# Patient Record
Sex: MALE | Race: WHITE | Employment: OTHER | URBAN - NONMETROPOLITAN AREA
[De-identification: names, ages, dates, MRNs, and addresses within clinical notes are randomized per-mention and may not be internally consistent; named-entity substitution may affect disease eponyms.]

---

## 2021-12-22 ENCOUNTER — APPOINTMENT (OUTPATIENT)
Dept: CT IMAGING | Age: 61
End: 2021-12-22
Payer: MEDICARE

## 2021-12-22 ENCOUNTER — APPOINTMENT (OUTPATIENT)
Dept: ULTRASOUND IMAGING | Age: 61
End: 2021-12-22
Payer: MEDICARE

## 2021-12-22 ENCOUNTER — HOSPITAL ENCOUNTER (EMERGENCY)
Age: 61
Discharge: HOME | End: 2021-12-22
Attending: EMERGENCY MEDICINE
Payer: MEDICARE

## 2021-12-22 ENCOUNTER — APPOINTMENT (OUTPATIENT)
Dept: GENERAL RADIOLOGY | Age: 61
End: 2021-12-22
Payer: MEDICARE

## 2021-12-22 VITALS
OXYGEN SATURATION: 99 % | BODY MASS INDEX: 23.62 KG/M2 | SYSTOLIC BLOOD PRESSURE: 115 MMHG | HEART RATE: 81 BPM | TEMPERATURE: 98.5 F | WEIGHT: 190 LBS | RESPIRATION RATE: 16 BRPM | DIASTOLIC BLOOD PRESSURE: 84 MMHG | HEIGHT: 75 IN

## 2021-12-22 DIAGNOSIS — R10.31 RIGHT LOWER QUADRANT ABDOMINAL PAIN: Primary | ICD-10-CM

## 2021-12-22 DIAGNOSIS — N50.819 PAIN IN TESTICLE, UNSPECIFIED LATERALITY: ICD-10-CM

## 2021-12-22 LAB
A/G RATIO: 1.8 (ref 1.1–2.2)
ALBUMIN SERPL-MCNC: 4.7 G/DL (ref 3.4–5)
ALP BLD-CCNC: 94 U/L (ref 40–129)
ALT SERPL-CCNC: 22 U/L (ref 10–40)
ANION GAP SERPL CALCULATED.3IONS-SCNC: 13 MMOL/L (ref 3–16)
AST SERPL-CCNC: 30 U/L (ref 15–37)
BACTERIA: ABNORMAL /HPF
BASOPHILS ABSOLUTE: 0.1 K/UL (ref 0–0.2)
BASOPHILS RELATIVE PERCENT: 1 %
BILIRUB SERPL-MCNC: 0.5 MG/DL (ref 0–1)
BILIRUBIN URINE: NEGATIVE
BLOOD, URINE: ABNORMAL
BUN BLDV-MCNC: 15 MG/DL (ref 7–20)
CALCIUM SERPL-MCNC: 9.8 MG/DL (ref 8.3–10.6)
CHLORIDE BLD-SCNC: 105 MMOL/L (ref 99–110)
CLARITY: CLEAR
CO2: 23 MMOL/L (ref 21–32)
COLOR: YELLOW
CREAT SERPL-MCNC: 0.7 MG/DL (ref 0.9–1.3)
EOSINOPHILS ABSOLUTE: 0.1 K/UL (ref 0–0.6)
EOSINOPHILS RELATIVE PERCENT: 0.6 %
EPITHELIAL CELLS, UA: ABNORMAL /HPF (ref 0–5)
GFR AFRICAN AMERICAN: >60
GFR NON-AFRICAN AMERICAN: >60
GLUCOSE BLD-MCNC: 127 MG/DL (ref 70–99)
GLUCOSE URINE: NEGATIVE MG/DL
HCT VFR BLD CALC: 43.6 % (ref 40.5–52.5)
HEMOGLOBIN: 15.1 G/DL (ref 13.5–17.5)
KETONES, URINE: 15 MG/DL
LACTIC ACID: 1.7 MMOL/L (ref 0.4–2)
LEUKOCYTE ESTERASE, URINE: NEGATIVE
LIPASE: 47 U/L (ref 13–60)
LYMPHOCYTES ABSOLUTE: 2.8 K/UL (ref 1–5.1)
LYMPHOCYTES RELATIVE PERCENT: 18.9 %
MCH RBC QN AUTO: 33.9 PG (ref 26–34)
MCHC RBC AUTO-ENTMCNC: 34.6 G/DL (ref 31–36)
MCV RBC AUTO: 97.9 FL (ref 80–100)
MICROSCOPIC EXAMINATION: YES
MONOCYTES ABSOLUTE: 0.6 K/UL (ref 0–1.3)
MONOCYTES RELATIVE PERCENT: 4 %
MUCUS: ABNORMAL /LPF
NEUTROPHILS ABSOLUTE: 11.1 K/UL (ref 1.7–7.7)
NEUTROPHILS RELATIVE PERCENT: 75.5 %
NITRITE, URINE: NEGATIVE
OCCULT BLOOD DIAGNOSTIC: NORMAL
PDW BLD-RTO: 13.7 % (ref 12.4–15.4)
PH UA: 7 (ref 5–8)
PLATELET # BLD: 257 K/UL (ref 135–450)
PMV BLD AUTO: 5.8 FL (ref 5–10.5)
POTASSIUM REFLEX MAGNESIUM: 4.6 MMOL/L (ref 3.5–5.1)
PROTEIN UA: NEGATIVE MG/DL
RBC # BLD: 4.45 M/UL (ref 4.2–5.9)
RBC UA: ABNORMAL /HPF (ref 0–4)
SODIUM BLD-SCNC: 141 MMOL/L (ref 136–145)
SPECIFIC GRAVITY UA: 1.02 (ref 1–1.03)
TOTAL PROTEIN: 7.3 G/DL (ref 6.4–8.2)
URINE REFLEX TO CULTURE: ABNORMAL
URINE TYPE: ABNORMAL
UROBILINOGEN, URINE: 0.2 E.U./DL
WBC # BLD: 14.7 K/UL (ref 4–11)
WBC UA: ABNORMAL /HPF (ref 0–5)

## 2021-12-22 PROCEDURE — 76870 US EXAM SCROTUM: CPT

## 2021-12-22 PROCEDURE — 85025 COMPLETE CBC W/AUTO DIFF WBC: CPT

## 2021-12-22 PROCEDURE — 71045 X-RAY EXAM CHEST 1 VIEW: CPT

## 2021-12-22 PROCEDURE — 82270 OCCULT BLOOD FECES: CPT

## 2021-12-22 PROCEDURE — 2580000003 HC RX 258: Performed by: EMERGENCY MEDICINE

## 2021-12-22 PROCEDURE — 6360000004 HC RX CONTRAST MEDICATION: Performed by: EMERGENCY MEDICINE

## 2021-12-22 PROCEDURE — 6370000000 HC RX 637 (ALT 250 FOR IP): Performed by: EMERGENCY MEDICINE

## 2021-12-22 PROCEDURE — 6360000002 HC RX W HCPCS: Performed by: EMERGENCY MEDICINE

## 2021-12-22 PROCEDURE — 96375 TX/PRO/DX INJ NEW DRUG ADDON: CPT

## 2021-12-22 PROCEDURE — 83605 ASSAY OF LACTIC ACID: CPT

## 2021-12-22 PROCEDURE — 36415 COLL VENOUS BLD VENIPUNCTURE: CPT

## 2021-12-22 PROCEDURE — 81001 URINALYSIS AUTO W/SCOPE: CPT

## 2021-12-22 PROCEDURE — 83690 ASSAY OF LIPASE: CPT

## 2021-12-22 PROCEDURE — 96374 THER/PROPH/DIAG INJ IV PUSH: CPT

## 2021-12-22 PROCEDURE — 99285 EMERGENCY DEPT VISIT HI MDM: CPT

## 2021-12-22 PROCEDURE — 74177 CT ABD & PELVIS W/CONTRAST: CPT

## 2021-12-22 PROCEDURE — 80053 COMPREHEN METABOLIC PANEL: CPT

## 2021-12-22 PROCEDURE — 93975 VASCULAR STUDY: CPT

## 2021-12-22 RX ORDER — FENTANYL CITRATE 50 UG/ML
50 INJECTION, SOLUTION INTRAMUSCULAR; INTRAVENOUS ONCE
Status: COMPLETED | OUTPATIENT
Start: 2021-12-22 | End: 2021-12-22

## 2021-12-22 RX ORDER — BUTALBITAL, ACETAMINOPHEN AND CAFFEINE 50; 325; 40 MG/1; MG/1; MG/1
TABLET ORAL
COMMUNITY
Start: 2021-11-23

## 2021-12-22 RX ORDER — OXYCODONE HYDROCHLORIDE 20 MG/1
TABLET ORAL
COMMUNITY
Start: 2021-11-26

## 2021-12-22 RX ORDER — ESOMEPRAZOLE MAGNESIUM 40 MG/1
CAPSULE, DELAYED RELEASE ORAL
COMMUNITY
Start: 2021-12-21

## 2021-12-22 RX ORDER — PROMETHAZINE HYDROCHLORIDE 25 MG/1
TABLET ORAL
COMMUNITY
Start: 2021-11-23

## 2021-12-22 RX ORDER — FLUOXETINE HYDROCHLORIDE 20 MG/1
20 CAPSULE ORAL DAILY
COMMUNITY

## 2021-12-22 RX ORDER — TRIAMCINOLONE ACETONIDE 1 MG/G
CREAM TOPICAL
COMMUNITY
Start: 2021-12-03

## 2021-12-22 RX ORDER — ONDANSETRON 2 MG/ML
4 INJECTION INTRAMUSCULAR; INTRAVENOUS ONCE
Status: COMPLETED | OUTPATIENT
Start: 2021-12-22 | End: 2021-12-22

## 2021-12-22 RX ORDER — OXYCODONE HYDROCHLORIDE 5 MG/1
15 TABLET ORAL ONCE
Status: COMPLETED | OUTPATIENT
Start: 2021-12-22 | End: 2021-12-22

## 2021-12-22 RX ORDER — 0.9 % SODIUM CHLORIDE 0.9 %
1000 INTRAVENOUS SOLUTION INTRAVENOUS ONCE
Status: COMPLETED | OUTPATIENT
Start: 2021-12-22 | End: 2021-12-22

## 2021-12-22 RX ORDER — DROPERIDOL 2.5 MG/ML
1.25 INJECTION, SOLUTION INTRAMUSCULAR; INTRAVENOUS ONCE
Status: COMPLETED | OUTPATIENT
Start: 2021-12-22 | End: 2021-12-22

## 2021-12-22 RX ORDER — ATORVASTATIN CALCIUM 20 MG/1
TABLET, FILM COATED ORAL
COMMUNITY
Start: 2021-12-21

## 2021-12-22 RX ADMIN — SODIUM CHLORIDE 1000 ML: 9 INJECTION, SOLUTION INTRAVENOUS at 13:10

## 2021-12-22 RX ADMIN — ONDANSETRON HYDROCHLORIDE 4 MG: 2 INJECTION, SOLUTION INTRAMUSCULAR; INTRAVENOUS at 13:07

## 2021-12-22 RX ADMIN — FENTANYL CITRATE 50 MCG: 0.05 INJECTION, SOLUTION INTRAMUSCULAR; INTRAVENOUS at 13:07

## 2021-12-22 RX ADMIN — IOPAMIDOL 75 ML: 755 INJECTION, SOLUTION INTRAVENOUS at 13:43

## 2021-12-22 RX ADMIN — DROPERIDOL 1.25 MG: 2.5 INJECTION, SOLUTION INTRAMUSCULAR; INTRAVENOUS at 14:53

## 2021-12-22 RX ADMIN — OXYCODONE 15 MG: 5 TABLET ORAL at 14:53

## 2021-12-22 ASSESSMENT — PAIN DESCRIPTION - PAIN TYPE
TYPE: ACUTE PAIN

## 2021-12-22 ASSESSMENT — PAIN SCALES - WONG BAKER: WONGBAKER_NUMERICALRESPONSE: 2

## 2021-12-22 ASSESSMENT — PAIN SCALES - GENERAL
PAINLEVEL_OUTOF10: 7
PAINLEVEL_OUTOF10: 10
PAINLEVEL_OUTOF10: 7
PAINLEVEL_OUTOF10: 8
PAINLEVEL_OUTOF10: 8

## 2021-12-22 ASSESSMENT — PAIN DESCRIPTION - ORIENTATION
ORIENTATION: RIGHT;LOWER
ORIENTATION: LOWER
ORIENTATION: RIGHT;LOWER
ORIENTATION: RIGHT

## 2021-12-22 ASSESSMENT — PAIN DESCRIPTION - FREQUENCY
FREQUENCY: CONTINUOUS
FREQUENCY: INTERMITTENT

## 2021-12-22 ASSESSMENT — PAIN DESCRIPTION - ONSET
ONSET: ON-GOING

## 2021-12-22 ASSESSMENT — PAIN DESCRIPTION - DESCRIPTORS
DESCRIPTORS: STABBING
DESCRIPTORS: STABBING

## 2021-12-22 ASSESSMENT — PAIN DESCRIPTION - LOCATION
LOCATION: ABDOMEN
LOCATION: BACK
LOCATION: ABDOMEN

## 2021-12-22 ASSESSMENT — PAIN DESCRIPTION - PROGRESSION
CLINICAL_PROGRESSION: NOT CHANGED
CLINICAL_PROGRESSION: GRADUALLY IMPROVING
CLINICAL_PROGRESSION: GRADUALLY WORSENING

## 2021-12-22 NOTE — ED NOTES
N2N report called to Leann PETERSON at [facility] ED, all questions answered at this time.      Aruna Sanchez RN  12/22/21 7612 age(85 years old or older)

## 2021-12-22 NOTE — ED NOTES
Patient and family educated to go straight to [facility] ED, without stopping to eat or drink. Patient and family verbalized understanding. PIV removed at this time, and transfer paperwork signed by patient.      Jorge David RN  12/22/21 0652

## 2021-12-22 NOTE — ED PROVIDER NOTES
Emergency Department Provider Note  Location: [place] [address] ED  12/22/2021     Patient Identification  Adeola Freeman is a 63 y.o. male    Chief Complaint  Abdominal Pain (right lower abd pain with vomiting that started around 0130, that radiates into groin. Pt reports taking phenergan at home around 1100 prior to ems arrival. )          HPI  (History provided by patient)  Patient is a 81-year-old male with remote history of AML who presents with right-sided abdominal pain since 130. Nonbloody nonbilious emesis. Constant in nature possibly cough. No exacerbating or alleviating factors. No back pain, no urinary sx. Pain radiates to groin. No chest pain shortness of breath no diarrhea. Passing stool passing flatus. I have reviewed the following nursing documentation:  Allergies: Allergies   Allergen Reactions    Latex Hives    Cinnamon Nausea And Vomiting    Vicodin [Hydrocodone-Acetaminophen] Nausea And Vomiting       Past medical history:  has a past medical history of AML (acute myeloblastic leukemia) ([facility] [address] Past surgical history:  has a past surgical history that includes Cholecystectomy and hernia repair. Home medications:   Prior to Admission medications    Medication Sig Start Date End Date Taking?  Authorizing Provider   oxyCODONE (ROXICODONE) 20 MG immediate release tablet  11/26/21  Yes Historical Provider, MD   butalbital-acetaminophen-caffeine (FIORICET, [address]) -40 MG per tablet  11/23/21  Yes Historical Provider, MD   atorvastatin (LIPITOR) 20 MG tablet  12/21/21  Yes Historical Provider, MD   esomeprazole ([address]) 40 MG delayed release capsule  12/21/21  Yes Historical Provider, MD   promethazine (PHENERGAN) 25 MG tablet  11/23/21  Yes Historical Provider, MD   FLUoxetine (PROZAC) 20 MG capsule Take 20 mg by mouth daily   Yes Historical Provider, MD   triamcinolone (KENALOG) 0.1 % cream  12/3/21   Historical Provider, MD       Social history:  reports that he has been smoking cigarettes. He has been smoking about 1.00 pack per day. He has never used smokeless tobacco. He reports previous alcohol use. He reports previous drug use. Family history:  History reviewed. No pertinent family history. ROS  Review of Systems   Constitutional: Negative for chills and fever. HENT: Negative for congestion and rhinorrhea. Eyes: Negative for photophobia and visual disturbance. Respiratory: Negative for cough, shortness of breath and wheezing. Cardiovascular: Negative for chest pain and palpitations. Gastrointestinal: Positive for abdominal pain, nausea and vomiting. Negative for diarrhea. Genitourinary: Positive for testicular pain. Negative for dysuria and hematuria. Musculoskeletal: Negative for back pain and neck pain. Skin: Negative for rash and wound. Neurological: Negative for syncope and weakness. Psychiatric/Behavioral: Negative for agitation and confusion. Exam  ED Triage Vitals   BP Temp Temp src Pulse Resp SpO2 Height Weight   -- -- -- -- -- -- -- --       Physical Exam  Vitals and nursing note reviewed. Exam conducted with a chaperone present. Constitutional:       General: He is not in acute distress. Appearance: He is well-developed. HENT:      Head: Normocephalic and atraumatic. Nose: Nose normal. No congestion. Eyes:      Extraocular Movements: Extraocular movements intact. Pupils: Pupils are equal, round, and reactive to light. Cardiovascular:      Rate and Rhythm: Normal rate and regular rhythm. Heart sounds: No murmur heard. Pulmonary:      Effort: Pulmonary effort is normal.      Breath sounds: Normal breath sounds. Abdominal:      General: There is no distension. Palpations: Abdomen is soft. Comments: Right-sided tenderness, tender McBurney's, no guarding or rebound   Genitourinary:     Rectum: Guaiac result negative. Comments: External genitalia normal appearing.   Equivocal cremaster reflex bilaterally. Right testicle is tender. Normal testicular lie. There is no swelling or masses appreciated. No obvious inguinal hernia. Musculoskeletal:         General: No deformity. Normal range of motion. Cervical back: Normal range of motion and neck supple. Skin:     General: Skin is warm. Findings: No rash. Neurological:      Mental Status: He is alert and oriented to person, place, and time. Motor: No abnormal muscle tone. Coordination: Coordination normal.   Psychiatric:         Mood and Affect: Mood normal.         Behavior: Behavior normal.           ED Course    ED Medication Orders (From admission, onward)    Start Ordered     Status Ordering Provider    12/22/21 1515 12/22/21 1449  oxyCODONE (ROXICODONE) immediate release tablet 15 mg  ONCE         Last MAR action: Given - by Rexine Councilman on 12/22/21 at [address] [place], [place] [address]N     12/22/21 1500 12/22/21 1434  droperidol (INAPSINE) injection 1.25 mg  ONCE         Last MAR action: Given - by Rexine Councilman on 12/22/21 at [address] [place] [address]    12/22/21 1335 12/22/21 1336  iopamidol (ISOVUE-370) 76 % injection 75 mL  IMG ONCE PRN         Last MAR action: Given - by Kim Lockett [facility]     12/22/21 1330 12/22/21 1304  fentaNYL (SUBLIMAZE) injection 50 mcg  ONCE         Last MAR action: Given - [facility] Rexine Councilman [facility] [address] [facility]    12/22/21 1330 12/22/21 1304  0.9 % sodium chloride bolus  ONCE         Last MAR action: Stopped - by Rexine Councilman on 12/22/21 at [address]    12/22/21 1330 12/22/21 1304  ondansetron (ZOFRAN) injection 4 mg  ONCE         Last MAR action: Given - by Rexine Councilman on 12/22/21 at [address] [facility], [address] [address]              Radiology  No results found.       Labs  Results for orders placed or performed during the hospital encounter of 12/22/21   CBC Auto Differential   Result Value Ref Range    WBC 14.7 (H) 4.0 - 11.0 K/uL    RBC 4.45 4.20 - 5.90 M/uL    Hemoglobin 15.1 13.5 - 17.5 g/dL    Hematocrit 43.6 40.5 - 52.5 %    MCV 97.9 80.0 - 100.0 fL    MCH 33.9 26.0 - 34.0 pg    MCHC 34.6 31.0 - 36.0 g/dL    RDW 13.7 12.4 - 15.4 %    Platelets 831 621 - 858 K/uL    MPV 5.8 5.0 - 10.5 fL    Neutrophils % 75.5 %    Lymphocytes % 18.9 %    Monocytes % 4.0 %    Eosinophils % 0.6 %    Basophils % 1.0 %    Neutrophils Absolute 11.1 (H) 1.7 - 7.7 K/uL    Lymphocytes Absolute 2.8 1.0 - 5.1 K/uL    Monocytes Absolute 0.6 0.0 - 1.3 K/uL    Eosinophils Absolute 0.1 0.0 - 0.6 K/uL    Basophils Absolute 0.1 0.0 - 0.2 K/uL   Comprehensive Metabolic Panel w/ Reflex to MG   Result Value Ref Range    Sodium 141 136 - 145 mmol/L    Potassium reflex Magnesium 4.6 3.5 - 5.1 mmol/L    Chloride 105 99 - 110 mmol/L    CO2 23 21 - 32 mmol/L    Anion Gap 13 3 - 16    Glucose 127 (H) 70 - 99 mg/dL    BUN 15 7 - 20 mg/dL    CREATININE 0.7 (L) 0.9 - 1.3 mg/dL    GFR Non-African American >60 >60    GFR African American >60 >60    Calcium 9.8 8.3 - 10.6 mg/dL    Total Protein 7.3 6.4 - 8.2 g/dL    Albumin 4.7 3.4 - 5.0 g/dL    Albumin/Globulin Ratio 1.8 1.1 - 2.2    Total Bilirubin 0.5 0.0 - 1.0 mg/dL    Alkaline Phosphatase 94 40 - 129 U/L    ALT 22 10 - 40 U/L    AST 30 15 - 37 U/L   Lipase   Result Value Ref Range    Lipase 47.0 13.0 - 60.0 U/L   Urinalysis Reflex to Culture    Specimen: Urine, clean catch   Result Value Ref Range    Color, UA Yellow Straw/Yellow    Clarity, UA Clear Clear    Glucose, Ur Negative Negative mg/dL    Bilirubin Urine Negative Negative    Ketones, Urine 15 (A) Negative mg/dL    Specific Gravity, UA 1.025 1.005 - 1.030    Blood, Urine TRACE-INTACT (A) Negative    pH, UA 7.0 5.0 - 8.0    Protein, UA Negative Negative mg/dL    Urobilinogen, Urine 0.2 <2.0 E.U./dL    Nitrite, Urine Negative Negative    Leukocyte Esterase, Urine Negative Negative    Microscopic Examination YES     Urine Type NotGiven     Urine Reflex to Culture Not Indicated    Microscopic Urinalysis   Result Value Ref Range    Mucus, UA Rare (A) None Seen /LPF    WBC, UA 0-2 0 - 5 /HPF    RBC, UA 0-2 0 - 4 /HPF    Epithelial Cells, UA 0-1 0 - 5 /HPF    Bacteria, UA Rare (A) None Seen /HPF   Blood occult stool #1   Result Value Ref Range    Occult Blood Diagnostic Result: Negative  Normal range: Negative      Lactic acid, plasma   Result Value Ref Range    Lactic Acid 1.7 0.4 - 2.0 mmol/L         [facility]  Patient seen and evaluated. Relevant records reviewed. 65-year-old male who presents with generalized right-sided abdominal pain all quadrants and radiates to the groin as well. On arrival patient is dry heaving. Is vital signs are overall reassuring. He is generally tender on the right side including the right testicle. He sent for CT abdomen pelvis does not show any obvious acute pathology. I did receive a call from the radiologist who notes what I believed to be an incidental finding of contrast in the small bowel left of the umbilicus that may be consistent with GI bleed however patient has no melanic stool no story of GI bleed. Unclear cause of his pain. I do feel he warrants testicular ultrasound discussed with Dr. Chantel Howard at [facility] who agrees to lateral transfer for ultrasound study. Had discussion with patient who wishes to go by private vehicle. He understands he needs to go straight there and remain n.p.o.  I otherwise doubt any other vascular or cardiopulmonary or other emergent process at this point. Clinical Impression:  1. Right lower quadrant abdominal pain    2. Pain in testicle, unspecified laterality          Disposition:  Transfer to [facility] to ER in stable condition. Blood pressure 115/84, pulse 81, temperature 98.5 °F (36.9 °C), temperature source Oral, resp. rate 16, height 6' 3\" (1.905 m), weight 190 lb (86.2 kg), SpO2 99 %. Patient was given scripts for the following medications.  I counseled patient how to take these medications. Discharge Medication List as of 12/22/2021  7:47 PM          Disposition referral (if applicable):  Gabbi Sapp MD  [address] [place] [id number] [phone]    Call in 1 day          Total critical care time is 20 minutes, which excludes separately billable procedures and updating family. Time spent is specifically for management of the presenting complaint and symptoms initially, direct bedside care, reevaluation, review of records, and consultation. There was a high probability of clinically significant life-threatening deterioration in the patient's condition, which required my urgent intervention. This chart was generated in part by using Dragon Dictation system and may contain errors related to that system including errors in grammar, punctuation, and spelling, as well as words and phrases that may be inappropriate. If there are any questions or concerns please feel free to contact the dictating provider for clarification.      Salma Goss MD  [address] MD  12/24/21 4878

## 2021-12-23 NOTE — ED PROVIDER NOTES
[address] ED      CHIEF COMPLAINT  Abdominal Pain (right lower abd pain with vomiting that started around 0130, that radiates into groin. Pt reports taking phenergan at home around 1100 prior to ems arrival. )       HISTORY OF PRESENT ILLNESS  Robert Starkey is a 63 y.o. male with h/o AML who was transferred from [facility]  for evaluation of abdominal pain. Patient reports having his gallbladder surgery back in August.  Reports waking up 1 AM with abdominal pain. Describes having right lower abdominal pain as 10/10 pain with vomiting. Says the pain goes towards the groin. Reports having strangulated hernia 3-4 years ago and since he has been having similar symptoms. He had tried Phenergan at home around 11 AM with no improvement of symptoms. No other complaints, modifying factors or associated symptoms. I have reviewed the following from the nursing documentation. Past Medical History:   Diagnosis Date    AML (acute myeloblastic leukemia) ([facility] [address]      Past Surgical History:   Procedure Laterality Date    CHOLECYSTECTOMY      HERNIA REPAIR       History reviewed. No pertinent family history.   Social History     Socioeconomic History    Marital status:      Spouse name: Not on file    Number of children: Not on file    Years of education: Not on file    Highest education level: Not on file   Occupational History    Not on file   Tobacco Use    Smoking status: Current Every Day Smoker     Packs/day: 1.00     Types: Cigarettes    Smokeless tobacco: Never Used   Vaping Use    Vaping Use: Never used   Substance and Sexual Activity    Alcohol use: Not Currently    Drug use: Not Currently    Sexual activity: Yes     Partners: Female   Other Topics Concern    Not on file   Social History Narrative    Not on file     Social Determinants of Health     Financial Resource Strain:     Difficulty of Paying Living Expenses: Not on file   Food Insecurity:     Worried About Running Out of Food in the Last Year: Not on file    Ran Out of Food in the Last Year: Not on file   Transportation Needs:     Lack of Transportation (Medical): Not on file    Lack of Transportation (Non-Medical): Not on file   Physical Activity:     Days of Exercise per Week: Not on file    Minutes of Exercise per Session: Not on file   Stress:     Feeling of Stress : Not on file   Social Connections:     Frequency of Communication with Friends and Family: Not on file    Frequency of Social Gatherings with Friends and Family: Not on file    Attends Jehovah's witness Services: Not on file    Active Member of [address] [place] [address] [employer] or Organizations: Not on file    Attends Club or Organization Meetings: Not on file    Marital Status: Not on file   Intimate Partner Violence:     Fear of Current or Ex-Partner: Not on file    Emotionally Abused: Not on file    Physically Abused: Not on file    Sexually Abused: Not on file   Housing Stability:     Unable to Pay for Housing in the Last Year: Not on file    Number of [place] in the Last Year: Not on file    Unstable Housing in the Last Year: Not on file     No current facility-administered medications for this encounter. Current Outpatient Medications   Medication Sig Dispense Refill    oxyCODONE (ROXICODONE) 20 MG immediate release tablet       butalbital-acetaminophen-caffeine (FIORICET, ESGIC) -40 MG per tablet       atorvastatin (LIPITOR) 20 MG tablet       esomeprazole (NEXIUM) 40 MG delayed release capsule       promethazine (PHENERGAN) 25 MG tablet       FLUoxetine (PROZAC) 20 MG capsule Take 20 mg by mouth daily      triamcinolone (KENALOG) 0.1 % cream        Allergies   Allergen Reactions    Latex Hives    Cinnamon Nausea And Vomiting    Vicodin [Hydrocodone-Acetaminophen] Nausea And Vomiting       REVIEW OF SYSTEMS  10 systems reviewed, pertinent positives per HPI otherwise noted to be negative.     PHYSICAL EXAM  /84   Pulse 81   Temp 98.5 °F (36.9 °C) (Oral)   Resp 16   Ht 6' 3\" (1.905 m)   Wt 190 lb (86.2 kg)   SpO2 99%   BMI 23.75 kg/m²    GENERAL APPEARANCE: Awake and alert. No acute distress. HENT: Normocephalic. Atraumatic. PERRL. EOMI. No facial droop. HEART/CHEST: RRR. LUNGS: Respirations unlabored. Speaking comfortably in full sentences. ABDOMEN: Soft, non-distended abdomen. Non tender to palpation. No guarding. No rebound. EXTREMITIES: No gross deformities. Moving all extremities. : normal male genitalia. No testicular tenderness. SKIN: Warm and dry. No acute rashes. NEUROLOGICAL: Alert and oriented. No gross facial drooping. Answering questions appropriately. Moving all extremities. PSYCHIATRIC: Pleasant. Normal mood and affect.     LABS  Results for orders placed or performed during the hospital encounter of 12/22/21   CBC Auto Differential   Result Value Ref Range    WBC 14.7 (H) 4.0 - 11.0 K/uL    RBC 4.45 4.20 - 5.90 M/uL    Hemoglobin 15.1 13.5 - 17.5 g/dL    Hematocrit 43.6 40.5 - 52.5 %    MCV 97.9 80.0 - 100.0 fL    MCH 33.9 26.0 - 34.0 pg    MCHC 34.6 31.0 - 36.0 g/dL    RDW 13.7 12.4 - 15.4 %    Platelets 150 179 - 420 K/uL    MPV 5.8 5.0 - 10.5 fL    Neutrophils % 75.5 %    Lymphocytes % 18.9 %    Monocytes % 4.0 %    Eosinophils % 0.6 %    Basophils % 1.0 %    Neutrophils Absolute 11.1 (H) 1.7 - 7.7 K/uL    Lymphocytes Absolute 2.8 1.0 - 5.1 K/uL    Monocytes Absolute 0.6 0.0 - 1.3 K/uL    Eosinophils Absolute 0.1 0.0 - 0.6 K/uL    Basophils Absolute 0.1 0.0 - 0.2 K/uL   Comprehensive Metabolic Panel w/ Reflex to MG   Result Value Ref Range    Sodium 141 136 - 145 mmol/L    Potassium reflex Magnesium 4.6 3.5 - 5.1 mmol/L    Chloride 105 99 - 110 mmol/L    CO2 23 21 - 32 mmol/L    Anion Gap 13 3 - 16    Glucose 127 (H) 70 - 99 mg/dL    BUN 15 7 - 20 mg/dL    CREATININE 0.7 (L) 0.9 - 1.3 mg/dL    GFR Non-African American >60 >60    GFR African American >60 >60    Calcium 9.8 8.3 - 10.6 mg/dL    Total Protein 7.3 6.4 - 8.2 g/dL    Albumin 4.7 3.4 - 5.0 g/dL    Albumin/Globulin Ratio 1.8 1.1 - 2.2    Total Bilirubin 0.5 0.0 - 1.0 mg/dL    Alkaline Phosphatase 94 40 - 129 U/L    ALT 22 10 - 40 U/L    AST 30 15 - 37 U/L   Lipase   Result Value Ref Range    Lipase 47.0 13.0 - 60.0 U/L   Urinalysis Reflex to Culture    Specimen: Urine, clean catch   Result Value Ref Range    Color, UA Yellow Straw/Yellow    Clarity, UA Clear Clear    Glucose, Ur Negative Negative mg/dL    Bilirubin Urine Negative Negative    Ketones, Urine 15 (A) Negative mg/dL    Specific Gravity, UA 1.025 1.005 - 1.030    Blood, Urine TRACE-INTACT (A) Negative    pH, UA 7.0 5.0 - 8.0    Protein, UA Negative Negative mg/dL    Urobilinogen, Urine 0.2 <2.0 E.U./dL    Nitrite, Urine Negative Negative    Leukocyte Esterase, Urine Negative Negative    Microscopic Examination YES     Urine Type NotGiven     Urine Reflex to Culture Not Indicated    Microscopic Urinalysis   Result Value Ref Range    Mucus, UA Rare (A) None Seen /LPF    WBC, UA 0-2 0 - 5 /HPF    RBC, UA 0-2 0 - 4 /HPF    Epithelial Cells, UA 0-1 0 - 5 /HPF    Bacteria, UA Rare (A) None Seen /HPF   Blood occult stool #1   Result Value Ref Range    Occult Blood Diagnostic Result: Negative  Normal range: Negative      Lactic acid, plasma   Result Value Ref Range    Lactic Acid 1.7 0.4 - 2.0 mmol/L       I have reviewed all labs for this visit. RADIOLOGY  US SCROTUM AND TESTICLES    Result Date: 12/23/2021  EXAMINATION: DOPPLER EVALUATION OF THE PELVIS; ULTRASOUND OF THE SCROTUM/TESTICLES WITH COLOR DOPPLER FLOW EVALUATION 12/22/2021 TECHNIQUE: Duplex ultrasound using B-mode/gray scaled imaging and Doppler spectral analysis and color flow was obtained of the pelvis.; Duplex ultrasound using B-mode/gray scaled imaging, Doppler spectral analysis and color flow Doppler was obtained of the testicles. COMPARISON: None.  HISTORY: ORDERING SYSTEM PROVIDED HISTORY: to [facility] testicular pain TECHNOLOGIST PROVIDED HISTORY: Reason for exam:->to [place] for testicular pain; ORDERING SYSTEM PROVIDED HISTORY: testicular pain TECHNOLOGIST PROVIDED HISTORY: Reason for exam:->testicular pain FINDINGS: Measurements: Right Testicle: 4.7 x 2.8 x 1.9 cm Left Testicle: 4.8 x 4.2 x 1.4 cm Right: Grey Scale: The right testicle demonstrates normal homogeneous echotexture without focal lesion. No evidence of testicular microlithiasis. Doppler Evaluation: There is normal arterial and venous Doppler flow within the testicle. Scrotal Sac: No evidence of hydrocele. Epididymis: Small epididymal cyst measuring 0.2 x 0.4 x 0.3 cm. No hyperemia. Left: Grey Scale: The left testicle demonstrates normal homogeneous echotexture without focal lesion. No evidence of testicular microlithiasis. Doppler Evaluation: There is normal arterial and venous Doppler flow within the testicle. Scrotal Sac: 2 epididymal cysts are noted measuring 3.2 x 1.5 x 1.0 cm and 0.5 x 0.4 x 0.2 cm. No significant hyperemia. Epididymis: No acute abnormality. Bilateral epididymal cysts, left greater than right. Otherwise unremarkable scrotal ultrasound. Normal Doppler flow bilaterally. CT ABDOMEN PELVIS W IV CONTRAST Additional Contrast? None    Result Date: 12/22/2021  EXAMINATION: CT OF THE ABDOMEN AND PELVIS WITH CONTRAST 12/22/2021 1:12 pm TECHNIQUE: CT of the abdomen and pelvis was performed with the administration of intravenous contrast. Multiplanar reformatted images are provided for review. Dose modulation, iterative reconstruction, and/or weight based adjustment of the mA/kV was utilized to reduce the radiation dose to as low as reasonably achievable. COMPARISON: None.  HISTORY: ORDERING SYSTEM PROVIDED HISTORY: abd pain TECHNOLOGIST PROVIDED HISTORY: Additional Contrast?->None Reason for exam:->abd pain Decision Support Exception - unselect if not a suspected or confirmed emergency medical condition->Emergency Medical Condition ([place]) Reason for Exam: RUQ pain with nausea and vomiting since this AM, Relevant Medical/Surgical History: surgeries gallbladder, hernia repairs, HX of AML FINDINGS: Lower Chest: Calcified granuloma seen in the right middle lobe. Lung bases otherwise clear. Small hiatal hernia noted. Organs: The liver, spleen, adrenals and pancreas are within normal limits. Gallbladder removed surgically. Symmetric nephrograms. No hydronephrosis. Hypodense subcentimeter area in the inferior right renal pole is too small to characterize but statistically represents a cyst. GI/Bowel: No dilated loops of small or large bowel. There is high density material seen within proximal small bowel in the left side as seen on series 601, image 45. High density material also noted in the terminal ileum/right hemicolon. No pericolonic inflammatory change. The appendix is visualized and is normal. Pelvis: Prosthetic calcifications noted. Peritoneum/Retroperitoneum: No free fluid or free air. Bones/Soft Tissues: Multilevel degenerative changes of the spine are present. 1. High density material seen within proximal left small bowel. High density material is also noted in the terminal ileum and cecum. This could represent ingested material, previous contrast administration or in the proper clinical setting, be evidence of a GI bleed. Recommend clinical correlation and correlation with hemoglobin levels. Consider dedicated bleeding scan if there is further concern. 2. Otherwise, no CT evidence of an acute inflammatory process. Findings were discussed with Quan Simpson at 2:17 pm on 12/22/2021. XR CHEST PORTABLE    Result Date: 12/22/2021  EXAMINATION: ONE XRAY VIEW OF THE CHEST 12/22/2021 2:58 pm COMPARISON: None. HISTORY: ORDERING SYSTEM PROVIDED HISTORY: right abd pain TECHNOLOGIST PROVIDED HISTORY: Reason for exam:->right abd pain Reason for Exam: emesis FINDINGS: HEART/MEDIASTINUM: The cardiomediastinal silhouette is within normal limits. PLEURA/LUNGS: There are no focal consolidations or pleural effusions. There is no appreciable pneumothorax. BONES/SOFT TISSUE: No acute abnormality. No radiographic evidence of acute pulmonary disease. US DUP ABD PEL RETRO SCROT COMPLETE    Result Date: 12/23/2021  EXAMINATION: DOPPLER EVALUATION OF THE PELVIS; ULTRASOUND OF THE SCROTUM/TESTICLES WITH COLOR DOPPLER FLOW EVALUATION 12/22/2021 TECHNIQUE: Duplex ultrasound using B-mode/gray scaled imaging and Doppler spectral analysis and color flow was obtained of the pelvis.; Duplex ultrasound using B-mode/gray scaled imaging, Doppler spectral analysis and color flow Doppler was obtained of the testicles. COMPARISON: None. HISTORY: ORDERING SYSTEM PROVIDED HISTORY: to [place] for testicular pain TECHNOLOGIST PROVIDED HISTORY: Reason for exam:->to [place] for testicular pain; ORDERING SYSTEM PROVIDED HISTORY: testicular pain TECHNOLOGIST PROVIDED HISTORY: Reason for exam:->testicular pain FINDINGS: Measurements: Right Testicle: 4.7 x 2.8 x 1.9 cm Left Testicle: 4.8 x 4.2 x 1.4 cm Right: Grey Scale: The right testicle demonstrates normal homogeneous echotexture without focal lesion. No evidence of testicular microlithiasis. Doppler Evaluation: There is normal arterial and venous Doppler flow within the testicle. Scrotal Sac: No evidence of hydrocele. Epididymis: Small epididymal cyst measuring 0.2 x 0.4 x 0.3 cm. No hyperemia. Left: Grey Scale: The left testicle demonstrates normal homogeneous echotexture without focal lesion. No evidence of testicular microlithiasis. Doppler Evaluation: There is normal arterial and venous Doppler flow within the testicle. Scrotal Sac: 2 epididymal cysts are noted measuring 3.2 x 1.5 x 1.0 cm and 0.5 x 0.4 x 0.2 cm. No significant hyperemia. Epididymis: No acute abnormality. Bilateral epididymal cysts, left greater than right. Otherwise unremarkable scrotal ultrasound. Normal Doppler flow bilaterally.      ED COURSE/MDM  Patient seen and evaluated. At presentation, patient was awake, alert, afebrile, hemodynamically stable, and satting well on room air. Patient was seen at [facility] and sent to the emergency department for a testicular ultrasound. Patient was sent over from for the ultrasound. On my assessment, patient reports having no abdominal pain or testicular pain. Patient's work-up personally reviewed and interpreted. Discussed the CT findings with patient. He was instructed to establish care with GI for further evaluation and treatment. Patient verbalized understanding and reports he will call to schedule a endoscopy. Testicular ultrasound shows bilateral epididymal cysts, left greater than right. Otherwise unremarkable scrotal ultrasound. Patient reports knowing about the epididymal cysts. As patient denies having any symptoms, I do not think patient warrants further evaluation at this time. he was discharged home with strict return precautions. Pt was seen during the [place] 19 pandemic. Appropriate PPE worn by ME during patient encounters. Pt seen during a time with constrained hospital bed capacity and other potential inpatient and outpatient resources were constrained due to the viral pandemic. During the patient's ED course, the patient was given:  Medications   fentaNYL (SUBLIMAZE) injection 50 mcg (50 mcg IntraVENous Given 12/22/21 1307)   0.9 % sodium chloride bolus (0 mLs IntraVENous Stopped 12/22/21 1410)   ondansetron (ZOFRAN) injection 4 mg (4 mg IntraVENous Given 12/22/21 1307)   iopamidol (ISOVUE-370) 76 % injection 75 mL (75 mLs IntraVENous Given 12/22/21 1343)   droperidol (INAPSINE) injection 1.25 mg (1.25 mg IntraVENous Given 12/22/21 1453)   oxyCODONE (ROXICODONE) immediate release tablet 15 mg (15 mg Oral Given 12/22/21 1453)        CLINICAL IMPRESSION  1. Right lower quadrant abdominal pain    2.  Pain in testicle, unspecified laterality        Blood pressure 115/84, pulse 81, temperature 98.5 °F (36.9 °C), temperature source Oral, resp. rate 16, height 6' 3\" (1.905 m), weight 190 lb (86.2 kg), SpO2 99 %. Burke Ortiz was discharged home in stable condition. Patient was given scripts for the following medications. I counseled patient how to take these medications. New Prescriptions    No medications on file       Follow-up with:  No follow-up provider specified. DISCLAIMER: This chart was created using Dragon dictation software. Efforts were made by me to ensure accuracy, however some errors may be present due to limitations of this technology and occasionally words are not transcribed correctly.         Umm Nickerson MD  12/26/21 [id number]

## 2021-12-23 NOTE — ED NOTES
Pt discharged to home per order. Instructions reviewed with pt and all questions answered, pt voiced understanding. Ambulates out with steady gait accompanied by spouse.      Raysa Thorpe RN  12/22/21 1956

## 2021-12-24 ASSESSMENT — ENCOUNTER SYMPTOMS
PHOTOPHOBIA: 0
BACK PAIN: 0
RHINORRHEA: 0
SHORTNESS OF BREATH: 0
DIARRHEA: 0
WHEEZING: 0
COUGH: 0
VOMITING: 1
ABDOMINAL PAIN: 1
NAUSEA: 1

## 2023-11-16 ENCOUNTER — ANESTHESIA (OUTPATIENT)
Dept: ENDOSCOPY | Age: 63
End: 2023-11-16
Payer: MEDICARE

## 2023-11-16 ENCOUNTER — HOSPITAL ENCOUNTER (OUTPATIENT)
Age: 63
Setting detail: OUTPATIENT SURGERY
Discharge: HOME | End: 2023-11-16
Attending: INTERNAL MEDICINE | Admitting: INTERNAL MEDICINE
Payer: MEDICARE

## 2023-11-16 ENCOUNTER — ANESTHESIA EVENT (OUTPATIENT)
Dept: ENDOSCOPY | Age: 63
End: 2023-11-16
Payer: MEDICARE

## 2023-11-16 VITALS
OXYGEN SATURATION: 98 % | RESPIRATION RATE: 16 BRPM | WEIGHT: 174 LBS | TEMPERATURE: 97.6 F | DIASTOLIC BLOOD PRESSURE: 88 MMHG | BODY MASS INDEX: 21.64 KG/M2 | SYSTOLIC BLOOD PRESSURE: 134 MMHG | HEART RATE: 76 BPM | HEIGHT: 75 IN

## 2023-11-16 DIAGNOSIS — R11.10 VOMITING, UNSPECIFIED VOMITING TYPE, UNSPECIFIED WHETHER NAUSEA PRESENT: ICD-10-CM

## 2023-11-16 DIAGNOSIS — Z86.010 HISTORY OF COLON POLYPS: ICD-10-CM

## 2023-11-16 PROCEDURE — 7100000011 HC PHASE II RECOVERY - ADDTL 15 MIN: Performed by: INTERNAL MEDICINE

## 2023-11-16 PROCEDURE — 3609012400 HC EGD TRANSORAL BIOPSY SINGLE/MULTIPLE: Performed by: INTERNAL MEDICINE

## 2023-11-16 PROCEDURE — 6360000002 HC RX W HCPCS: Performed by: NURSE ANESTHETIST, CERTIFIED REGISTERED

## 2023-11-16 PROCEDURE — 7100000010 HC PHASE II RECOVERY - FIRST 15 MIN: Performed by: INTERNAL MEDICINE

## 2023-11-16 PROCEDURE — 3609027000 HC COLONOSCOPY: Performed by: INTERNAL MEDICINE

## 2023-11-16 PROCEDURE — 3700000000 HC ANESTHESIA ATTENDED CARE: Performed by: INTERNAL MEDICINE

## 2023-11-16 PROCEDURE — 3700000001 HC ADD 15 MINUTES (ANESTHESIA): Performed by: INTERNAL MEDICINE

## 2023-11-16 PROCEDURE — 88305 TISSUE EXAM BY PATHOLOGIST: CPT

## 2023-11-16 PROCEDURE — 2580000003 HC RX 258: Performed by: ANESTHESIOLOGY

## 2023-11-16 PROCEDURE — 2709999900 HC NON-CHARGEABLE SUPPLY: Performed by: INTERNAL MEDICINE

## 2023-11-16 RX ORDER — SODIUM CHLORIDE, SODIUM LACTATE, POTASSIUM CHLORIDE, CALCIUM CHLORIDE 600; 310; 30; 20 MG/100ML; MG/100ML; MG/100ML; MG/100ML
INJECTION, SOLUTION INTRAVENOUS CONTINUOUS
Status: DISCONTINUED | OUTPATIENT
Start: 2023-11-16 | End: 2023-11-16 | Stop reason: HOSPADM

## 2023-11-16 RX ORDER — LIDOCAINE HYDROCHLORIDE 20 MG/ML
INJECTION, SOLUTION INTRAVENOUS PRN
Status: DISCONTINUED | OUTPATIENT
Start: 2023-11-16 | End: 2023-11-16 | Stop reason: SDUPTHER

## 2023-11-16 RX ORDER — SUCRALFATE 1 G/1
1 TABLET ORAL 4 TIMES DAILY
Qty: 120 TABLET | Refills: 3 | Status: SHIPPED | OUTPATIENT
Start: 2023-11-16

## 2023-11-16 RX ORDER — ONDANSETRON HYDROCHLORIDE 8 MG/1
8 TABLET, FILM COATED ORAL 3 TIMES DAILY PRN
COMMUNITY
Start: 2023-10-31

## 2023-11-16 RX ORDER — SODIUM CHLORIDE 0.9 % (FLUSH) 0.9 %
5-40 SYRINGE (ML) INJECTION EVERY 12 HOURS SCHEDULED
Status: DISCONTINUED | OUTPATIENT
Start: 2023-11-16 | End: 2023-11-16 | Stop reason: HOSPADM

## 2023-11-16 RX ORDER — LIDOCAINE HYDROCHLORIDE 10 MG/ML
1 INJECTION, SOLUTION EPIDURAL; INFILTRATION; INTRACAUDAL; PERINEURAL
Status: DISCONTINUED | OUTPATIENT
Start: 2023-11-16 | End: 2023-11-16 | Stop reason: HOSPADM

## 2023-11-16 RX ORDER — PROPOFOL 10 MG/ML
INJECTION, EMULSION INTRAVENOUS PRN
Status: DISCONTINUED | OUTPATIENT
Start: 2023-11-16 | End: 2023-11-16 | Stop reason: SDUPTHER

## 2023-11-16 RX ORDER — SODIUM CHLORIDE 0.9 % (FLUSH) 0.9 %
5-40 SYRINGE (ML) INJECTION PRN
Status: DISCONTINUED | OUTPATIENT
Start: 2023-11-16 | End: 2023-11-16 | Stop reason: HOSPADM

## 2023-11-16 RX ORDER — PROPOFOL 10 MG/ML
INJECTION, EMULSION INTRAVENOUS CONTINUOUS PRN
Status: DISCONTINUED | OUTPATIENT
Start: 2023-11-16 | End: 2023-11-16 | Stop reason: SDUPTHER

## 2023-11-16 RX ORDER — PANTOPRAZOLE SODIUM 40 MG/1
40 TABLET, DELAYED RELEASE ORAL
Qty: 60 TABLET | Refills: 5 | Status: SHIPPED | OUTPATIENT
Start: 2023-11-16

## 2023-11-16 RX ADMIN — PROPOFOL 125 MCG/KG/MIN: 10 INJECTION, EMULSION INTRAVENOUS at 10:20

## 2023-11-16 RX ADMIN — LIDOCAINE HYDROCHLORIDE 50 MG: 20 INJECTION, SOLUTION INTRAVENOUS at 10:20

## 2023-11-16 RX ADMIN — PROPOFOL 100 MG: 10 INJECTION, EMULSION INTRAVENOUS at 10:20

## 2023-11-16 RX ADMIN — SODIUM CHLORIDE, POTASSIUM CHLORIDE, SODIUM LACTATE AND CALCIUM CHLORIDE: 600; 310; 30; 20 INJECTION, SOLUTION INTRAVENOUS at 10:01

## 2023-11-16 ASSESSMENT — PAIN DESCRIPTION - LOCATION: LOCATION: HEAD

## 2023-11-16 ASSESSMENT — PAIN SCALES - GENERAL
PAINLEVEL_OUTOF10: 0
PAINLEVEL_OUTOF10: 9
PAINLEVEL_OUTOF10: 0

## 2023-11-16 ASSESSMENT — PAIN DESCRIPTION - FREQUENCY: FREQUENCY: CONTINUOUS

## 2023-11-16 ASSESSMENT — PAIN - FUNCTIONAL ASSESSMENT
PAIN_FUNCTIONAL_ASSESSMENT: 0-10
PAIN_FUNCTIONAL_ASSESSMENT: ACTIVITIES ARE NOT PREVENTED
PAIN_FUNCTIONAL_ASSESSMENT: ACTIVITIES ARE NOT PREVENTED

## 2023-11-16 ASSESSMENT — PAIN DESCRIPTION - ORIENTATION: ORIENTATION: RIGHT;LEFT

## 2023-11-16 ASSESSMENT — PAIN DESCRIPTION - DESCRIPTORS
DESCRIPTORS: ACHING
DESCRIPTORS: ACHING;DULL

## 2023-11-16 ASSESSMENT — PAIN DESCRIPTION - ONSET: ONSET: ON-GOING

## 2023-11-16 ASSESSMENT — PAIN SCALES - WONG BAKER: WONGBAKER_NUMERICALRESPONSE: 0

## 2023-11-16 ASSESSMENT — PAIN DESCRIPTION - PAIN TYPE: TYPE: ACUTE PAIN

## 2023-11-16 NOTE — PROCEDURES
EGD/COLONOSCOPY PROCEDURE NOTE:        Patient: Mike Post  : 1960  Acct#:     Procedure:   Esophagogastroduodenoscopy with biopsy  Colonoscopy         Date:  2023     Surgeon:  Randy Short MD,     Referring Physician:  Kiana Bergeron MD        Preoperative Diagnosis:   62-year-old male referred with complaints of having intractable nausea vomiting for last 1 year, epigastric abdominal pain and weight loss of about 50 pounds. He is also here for surveillance colonoscopy with personal history of colon polyps. Anesthesia:    MAC anesthesia      Consent:  The patient or their legal guardian has signed an informed consent, and is aware of the potential risks, benefits, alternatives, and potential complications of this procedure. These include, but are not limited to hemorrhage, bleeding, post procedural pain, perforation, phlebitis, aspiration, hypotension, hypoxia, cardiovascular events such as arryhthmia, and possibly death. EGD PROCEDURE NOTE        Description of Procedure: The patient was then taken to the endoscopy suite, placed in the left lateral decubitus position and the above IV sedation was administrered. The Olympus video endoscope was placed through the patient's oropharynx without difficulty to the extent of the 2nd portion of the duodenum. The patient tolerated the procedure well and was taken to the post anesthesia care unit in good condition. Complications: None  EBL: none      Findings:  Esophagus:  Visualization of the esophagus demonstrated normal mucosa. GE junction at 40 cm. 2 cm sliding hiatal hernia. Stomach: The scope was then advanced into the stomach. Both forward and retroflexed views of the stomach were obtained. Visualization of the gastric body and antrum demonstrated diffuse erythematous mucosa seen biopsies obtained. 1 cm white clean-based ulcer seen near pyloric channel, biopsies obtained from the margins.   Fundus

## 2023-11-16 NOTE — DISCHARGE INSTRUCTIONS
soft or canned fruits. Good choices are applesauce, ripe bananas, and non-citrus fruit juice. Try milk, yogurt, or other milk products, if you can digest dairy without too many problems. Your doctor may limit milk and milk products for a while. If so, he or she may recommend a calcium and vitamin D supplement. Choose soft protein foods such as eggs, tofu, steamed fish, chicken, and turkey. Slow-cooking methods, such as stewing, will help soften meat. Chopping meat in a  or  also will make it easier to eat. Avoid nuts, raw vegetables, hard crackers, tough meats, and prunes and prune juice. Avoid foods that are very spicy, such as foods seasoned with black pepper, chili peppers, horseradish, or hot sauce. Avoid highly acidic foods such as citrus fruits, citrus fruit juices, and tomato-based foods. Avoid high-fat foods such as fried meat, chips, and rich desserts. Check with your doctor before you drink alcohol or beverages that have caffeine, such as coffee, tea, and cola beverages. Low-Fiber Diet: Care Instructions  Overview     When your bowels are irritated, you may need to limit fiber in your diet until the problem clears up. Your doctor and dietitian can help you design a low-fiber diet based on your health and what you prefer to eat. Ask your doctor how long you should stay on a low-fiber diet. Your doctor probably will have you start adding more fiber to your diet as you get better. Always talk with your doctor or dietitian before you make changes in your diet. Follow-up care is a key part of your treatment and safety. Be sure to make and go to all appointments, and call your doctor if you are having problems. It's also a good idea to know your test results and keep a list of the medicines you take. How can you care for yourself at home? Choose white or refined grains, and avoid whole grains.  That means eating white or refined cereals, breads, crackers, rice, or pasta.  Choose fruits and vegetables that have been peeled and cooked. Avoid fruits and vegetables that are raw or that have skins, seeds, or hulls. Eat cooked or canned fruit. Low-fiber fruits include applesauce, canned peaches, canned pears, and fruit juice without pulp. Eat low-fiber vegetables, which include well-cooked vegetables and vegetable juice. Drink or eat milk, yogurt, or other milk products if you can digest dairy without too many problems. Your doctor may limit milk and milk products for a while. If so, they may recommend a calcium and vitamin D supplement. Eat well-cooked meat, such as chicken, turkey, fish, or lean meat. You also can eat eggs and tofu.   Avoid these foods:  Bran, brown or wild rice, oatmeal, granola, corn, ayana crackers, barley, and whole wheat and other whole-grain breads, such as rye bread  Cereals with more than 2 grams of fiber a serving  Berries, rhubarb, prunes, prune juice, and all dried fruits  Raw vegetables and fruits  Foods that may cause gas, such as raw or cooked cabbage, broccoli, brussels sprouts, and cauliflower  Cooked dried beans, lentils, and split peas  Crunchy peanut butter  Ice cream with fruit pieces in it  Coconut, nuts, popcorn, raisins, and seeds, or any ice cream, yogurt, or cheese with these in them

## 2023-11-16 NOTE — ANESTHESIA POSTPROCEDURE EVALUATION
Department of Anesthesiology  Postprocedure Note    Patient: Miesha Rater  MRN: [id number]  YOB: 1960  Date of evaluation: 11/16/2023      Procedure Summary       Date: 11/16/23 Room / Location: [facility] [place] 03 / The [address] [facility]    Anesthesia Start: 1015 Anesthesia Stop: 9350    Procedures:       COLONOSCOPY      ESOPHAGOGASTRODUODENOSCOPY Diagnosis:       Vomiting, unspecified vomiting type, unspecified whether nausea present      History of colon polyps      (Vomiting, unspecified vomiting type, unspecified whether nausea present [R11.10])      (History of colon polyps [Z86.010])    Surgeons: Rebecca Henson MD Responsible Provider: Miguel Arellano MD    Anesthesia Type: MAC ASA Status: 3            Anesthesia Type: No value filed.     Dina Phase I: Dina Score: 10    Dina Phase II: Dina Score: 10      Anesthesia Post Evaluation    Patient location during evaluation: PACU  Patient participation: complete - patient participated  Level of consciousness: awake  Pain score: 0  Airway patency: patent  Nausea & Vomiting: no nausea  Complications: no  Cardiovascular status: hemodynamically stable  Respiratory status: acceptable  Hydration status: stable  Pain management: satisfactory to patient

## 2023-11-16 NOTE — PROGRESS NOTES
Ambulatory Surgery/Procedure Discharge Note    Vitals:    11/16/23 1120   BP: 134/88   Pulse: 76   Resp: 16   Temp:    SpO2: 98%       In: -   Out: 200 [Urine:200]    Restroom use offered before discharge. Yes, pt voi dper urinal     Pain assessment:  level of pain (1-10, 10 severe)  Pain Level: 9, pt states pain tolerable for discharge  Pt to Endoscopy recovery post EGD, Colonoscopy. Pt c/o migraine 9/10, pt states pain tolerable for discharge, pt states that he has migraine medication in the car. Pt denies nausea at this time, pt tolerating PO fluids well. Discharge instructions given to pt's wife and she states understanding of these instructions. Pt and pt's wife state that pt is \"ready to go. \"           Patient discharged to home/self care.  Patient discharged via wheel chair by transporter to waiting family/S.O.       11/16/2023 11:41 AM

## 2023-11-16 NOTE — ANESTHESIA PRE PROCEDURE
12/22/2021 01:03 PM    CALCIUM 9.8 12/22/2021 01:03 PM    BILITOT 0.5 12/22/2021 01:03 PM    ALKPHOS 94 12/22/2021 01:03 PM    AST 30 12/22/2021 01:03 PM    ALT 22 12/22/2021 01:03 PM       POC Tests: No results for input(s): \"POCGLU\", \"POCNA\", \"POCK\", \"POCCL\", \"POCBUN\", \"POCHEMO\", \"POCHCT\" in the last 72 hours. Coags: No results found for: \"PROTIME\", \"INR\", \"APTT\"    HCG (If Applicable): No results found for: \"PREGTESTUR\", \"PREGSERUM\", \"HCG\", \"HCGQUANT\"     ABGs: No results found for: \"PHART\", \"PO2ART\", \"LGG7BPR\", \"GQG2OSR\", \"BEART\", \"C6RMSMNW\"     Type & Screen (If Applicable):  No results found for: \"LABABO\", \"LABRH\"    Drug/Infectious Status (If Applicable):  No results found for: \"HIV\", \"HEPCAB\"    COVID-19 Screening (If Applicable): No results found for: \"COVID19\"        Anesthesia Evaluation  Patient summary reviewed and Nursing notes reviewed  Airway: Mallampati: II  TM distance: >3 FB   Neck ROM: full  Mouth opening: > = 3 FB   Dental:      Comment: Implants crown    Pulmonary:Negative Pulmonary ROS and normal exam                               Cardiovascular:  Exercise tolerance: no interval change                    Neuro/Psych:   Negative Neuro/Psych ROS              GI/Hepatic/Renal:            ROS comment: Abdominal pain. Endo/Other: Negative Endo/Other ROS   (+) : arthritis (Chronic pain):., malignancy/cancer (AML). Abdominal:             Vascular: negative vascular ROS. Other Findings:             Anesthesia Plan      MAC     ASA 3       Induction: intravenous. Anesthetic plan and risks discussed with patient. Plan discussed with CRNA.     Attending anesthesiologist reviewed and agrees with Yovany Akhtar MD   11/16/2023

## 2024-02-07 ENCOUNTER — ANESTHESIA (OUTPATIENT)
Dept: ENDOSCOPY | Age: 64
End: 2024-02-07
Payer: MEDICARE

## 2024-02-07 ENCOUNTER — HOSPITAL ENCOUNTER (OUTPATIENT)
Age: 64
Setting detail: OUTPATIENT SURGERY
Discharge: HOME | End: 2024-02-07
Attending: INTERNAL MEDICINE | Admitting: INTERNAL MEDICINE
Payer: MEDICARE

## 2024-02-07 ENCOUNTER — ANESTHESIA EVENT (OUTPATIENT)
Dept: ENDOSCOPY | Age: 64
End: 2024-02-07
Payer: MEDICARE

## 2024-02-07 VITALS
HEART RATE: 56 BPM | OXYGEN SATURATION: 100 % | BODY MASS INDEX: 23.25 KG/M2 | DIASTOLIC BLOOD PRESSURE: 85 MMHG | RESPIRATION RATE: 16 BRPM | WEIGHT: 187 LBS | SYSTOLIC BLOOD PRESSURE: 133 MMHG | TEMPERATURE: 97.3 F | HEIGHT: 75 IN

## 2024-02-07 DIAGNOSIS — K44.9 HIATAL HERNIA: ICD-10-CM

## 2024-02-07 PROCEDURE — 6360000002 HC RX W HCPCS: Performed by: NURSE ANESTHETIST, CERTIFIED REGISTERED

## 2024-02-07 PROCEDURE — 2709999900 HC NON-CHARGEABLE SUPPLY: Performed by: INTERNAL MEDICINE

## 2024-02-07 PROCEDURE — 2580000003 HC RX 258: Performed by: ANESTHESIOLOGY

## 2024-02-07 PROCEDURE — 3609012400 HC EGD TRANSORAL BIOPSY SINGLE/MULTIPLE: Performed by: INTERNAL MEDICINE

## 2024-02-07 PROCEDURE — 7100000010 HC PHASE II RECOVERY - FIRST 15 MIN: Performed by: INTERNAL MEDICINE

## 2024-02-07 PROCEDURE — 88305 TISSUE EXAM BY PATHOLOGIST: CPT

## 2024-02-07 PROCEDURE — 3700000000 HC ANESTHESIA ATTENDED CARE: Performed by: INTERNAL MEDICINE

## 2024-02-07 PROCEDURE — 7100000011 HC PHASE II RECOVERY - ADDTL 15 MIN: Performed by: INTERNAL MEDICINE

## 2024-02-07 RX ORDER — FAMOTIDINE 20 MG/1
20 TABLET, FILM COATED ORAL DAILY
Status: ON HOLD | COMMUNITY
Start: 2023-08-09 | End: 2024-02-07

## 2024-02-07 RX ORDER — PROPOFOL 10 MG/ML
INJECTION, EMULSION INTRAVENOUS PRN
Status: DISCONTINUED | OUTPATIENT
Start: 2024-02-07 | End: 2024-02-07 | Stop reason: SDUPTHER

## 2024-02-07 RX ORDER — SODIUM CHLORIDE, SODIUM LACTATE, POTASSIUM CHLORIDE, CALCIUM CHLORIDE 600; 310; 30; 20 MG/100ML; MG/100ML; MG/100ML; MG/100ML
INJECTION, SOLUTION INTRAVENOUS CONTINUOUS
Status: DISCONTINUED | OUTPATIENT
Start: 2024-02-07 | End: 2024-02-07 | Stop reason: HOSPADM

## 2024-02-07 RX ORDER — PSEUDOEPHEDRINE HCL 30 MG
1 TABLET ORAL 2 TIMES DAILY
COMMUNITY

## 2024-02-07 RX ORDER — FAMOTIDINE 40 MG/1
40 TABLET, FILM COATED ORAL NIGHTLY
Qty: 30 TABLET | Refills: 5 | Status: SHIPPED | OUTPATIENT
Start: 2024-02-07

## 2024-02-07 RX ORDER — LIDOCAINE HYDROCHLORIDE 20 MG/ML
INJECTION, SOLUTION INTRAVENOUS PRN
Status: DISCONTINUED | OUTPATIENT
Start: 2024-02-07 | End: 2024-02-07 | Stop reason: SDUPTHER

## 2024-02-07 RX ORDER — SUCRALFATE 1 G/1
1 TABLET ORAL 4 TIMES DAILY
Qty: 120 TABLET | Refills: 5 | Status: SHIPPED | OUTPATIENT
Start: 2024-02-07

## 2024-02-07 RX ORDER — PANTOPRAZOLE SODIUM 40 MG/1
40 TABLET, DELAYED RELEASE ORAL
Qty: 60 TABLET | Refills: 5 | Status: SHIPPED | OUTPATIENT
Start: 2024-02-07

## 2024-02-07 RX ORDER — PREDNISONE 10 MG/1
10 TABLET ORAL 2 TIMES DAILY PRN
COMMUNITY

## 2024-02-07 RX ADMIN — PROPOFOL 100 MG: 10 INJECTION, EMULSION INTRAVENOUS at 09:23

## 2024-02-07 RX ADMIN — LIDOCAINE HYDROCHLORIDE 100 MG: 20 INJECTION, SOLUTION INTRAVENOUS at 09:23

## 2024-02-07 RX ADMIN — PROPOFOL 175 MCG/KG/MIN: 10 INJECTION, EMULSION INTRAVENOUS at 09:22

## 2024-02-07 RX ADMIN — SODIUM CHLORIDE, POTASSIUM CHLORIDE, SODIUM LACTATE AND CALCIUM CHLORIDE: 600; 310; 30; 20 INJECTION, SOLUTION INTRAVENOUS at 09:04

## 2024-02-07 ASSESSMENT — PAIN DESCRIPTION - PAIN TYPE: TYPE: CHRONIC PAIN

## 2024-02-07 ASSESSMENT — PAIN DESCRIPTION - DIRECTION: RADIATING_TOWARDS: ALL OVER BODY

## 2024-02-07 ASSESSMENT — PAIN DESCRIPTION - ORIENTATION: ORIENTATION: UPPER;LOWER

## 2024-02-07 ASSESSMENT — PAIN DESCRIPTION - LOCATION: LOCATION: GENERALIZED

## 2024-02-07 ASSESSMENT — PAIN DESCRIPTION - ONSET: ONSET: ON-GOING

## 2024-02-07 ASSESSMENT — PAIN DESCRIPTION - FREQUENCY: FREQUENCY: CONTINUOUS

## 2024-02-07 ASSESSMENT — PAIN DESCRIPTION - DESCRIPTORS: DESCRIPTORS: ACHING

## 2024-02-07 ASSESSMENT — PAIN SCALES - GENERAL: PAINLEVEL_OUTOF10: 7

## 2024-02-07 NOTE — PROGRESS NOTES
Ambulatory Surgery/Procedure Discharge Note    Vitals:    02/07/24 0950   BP: 133/85   Pulse: 56   Resp: 16   Temp:    SpO2: 100%       No intake/output data recorded.    Restroom use offered before discharge.  Yes    Pain assessment:  none      Pt a&o x4. VSS. Pt denies pain and nausea. Reviewed d/c instructions and removed IV.      Patient discharged to home/self care. Patient discharged via wheel chair by transporter to waiting family/S.O.       2/7/2024 9:59 AM

## 2024-02-07 NOTE — ANESTHESIA PRE PROCEDURE
\"POCHEMO\", \"POCHCT\" in the last 72 hours.    Coags: No results found for: \"PROTIME\", \"INR\", \"APTT\"    HCG (If Applicable): No results found for: \"PREGTESTUR\", \"PREGSERUM\", \"HCG\", \"HCGQUANT\"     ABGs: No results found for: \"PHART\", \"PO2ART\", \"PDD6WYW\", \"IQV6EFI\", \"BEART\", \"D3DMIPRO\"     Type & Screen (If Applicable):  No results found for: \"LABABO\", \"LABRH\"    Drug/Infectious Status (If Applicable):  No results found for: \"HIV\", \"HEPCAB\"    COVID-19 Screening (If Applicable): No results found for: \"COVID19\"        Anesthesia Evaluation  Patient summary reviewed and Nursing notes reviewed  Airway: Mallampati: II  TM distance: >3 FB   Neck ROM: full  Mouth opening: > = 3 FB   Dental:      Comment: Implants crown    Pulmonary:Negative Pulmonary ROS and normal exam                               Cardiovascular:  Exercise tolerance: no interval change                    Neuro/Psych:   Negative Neuro/Psych ROS              GI/Hepatic/Renal:            ROS comment: Abdominal pain.   Endo/Other: Negative Endo/Other ROS   (+) : arthritis (Chronic pain):., malignancy/cancer (AML).                 Abdominal:             Vascular: negative vascular ROS.         Other Findings:             Anesthesia Plan      MAC     ASA 3       Induction: intravenous.      Anesthetic plan and risks discussed with patient.      Plan discussed with CRNA.    Attending anesthesiologist reviewed and agrees with Preprocedure content                HELEN LAWRENCE MD   2/7/2024

## 2024-02-07 NOTE — DISCHARGE INSTRUCTIONS
ENDOSCOPY DISCHARGE INSTRUCTIONS:    Call the physician that did your procedure for any questions or concerns:                   ACTIVITY:    You may be drowsy or lightheaded after receiving sedation. For the next 24 hours,  DO NOT operate the following: automobile, bicycle, motorcycle, , power tools or large equipment of any kind.  Do not drink alcohol, sign any legal documents or make any legal decisions for 24 hours.  Plan on bedrest or quiet relaxation today.  Resume normal activities in 24 hours    DIET:    Your first meal today should be light, avoiding spicy and fatty foods.  If you tolerate this first meal, then you may advance to your regular diet unless otherwise advised by your physician.    NOTIFY YOUR PHYSICIAN IF THESE SYMPTOMS OCCUR:  1. Fever (greater than 100)  5. Increased abdominal bloating  2. Severe pain    6. Excessive bleeding  3. Nausea and vomiting  7. Chest pain                                                                    4. Chills    8. Shortness of breath    NORMAL SYMPTOMS:  1. Sore throat  2. Gaseous discomfort: belching or flatus                   ADDITIONAL INSTRUCTIONS:      Recommendations:   Continue Protonix 40 mg twice daily before meals  Continue Pepcid 40 mg at bedtime  Continue Carafate 1 g 4 times daily  Avoid NSAIDs  Recommend mechanical soft diet as directed  Recommend repeating EGD in 6 to 8 weeks to check for healing of the ulcer     Jermaine Baldwin MD  [facility]  [phone]                Please review these discharge instructions this evening or tomorrow for   information you may have forgotten.            We want to thank you for choosing the [facility] as your health care provider. We always strive to  provide you with excellent care while you are here.  You may receive a survey in the mail regarding your care.  We would appreciate you taking a few minutes of your time to complete this survey.  Again, thank you for choosing the [facility]

## 2024-02-07 NOTE — ANESTHESIA POSTPROCEDURE EVALUATION
Department of Anesthesiology  Postprocedure Note    Patient: Jose Gallo  MRN: [id number]  YOB: 1960  Date of evaluation: 2/7/2024    Procedure Summary       Date: 02/07/24 Room / Location: [place] [address] / [facility]    Anesthesia Start: 0916 Anesthesia Stop: 0934    Procedure: EGD BIOPSY Diagnosis:       Hiatal hernia      (Hiatal hernia [K44.9])    Surgeons: Matias Baldwin MD Responsible Provider: Jorge Luis Garcia MD    Anesthesia Type: MAC ASA Status: 3            Anesthesia Type: No value filed.    Dina Phase I: Dina Score: 10    Dina Phase II: Dina Score: 10    Anesthesia Post Evaluation    Patient location during evaluation: PACU  Patient participation: complete - patient participated  Level of consciousness: awake  Pain score: 0  Airway patency: patent  Nausea & Vomiting: no nausea  Cardiovascular status: hemodynamically stable  Respiratory status: acceptable  Hydration status: stable  Pain management: satisfactory to patient    No notable events documented.

## 2024-02-07 NOTE — H&P
Pre-operative History and Physical    Patient: Jose Gallo  : 1960  Acct#:     History Obtained From:  patient    HISTORY OF PRESENT ILLNESS:    The patient is a 63 y.o. male who presents with h/o pyloric channel ulcer for re-evaluation    Past Medical History:        Diagnosis Date    AML (acute myeloblastic leukemia) (HCC)     Hyperlipidemia     Pain management     from  service     Past Surgical History:        Procedure Laterality Date    CHOLECYSTECTOMY      COLONOSCOPY N/A 2023    COLONOSCOPY performed by Matias Baldwin MD at [facility] ENDOSCOPY    GROIN SURGERY      masses removed    HERNIA REPAIR Bilateral     inguinal    UPPER GASTROINTESTINAL ENDOSCOPY N/A 2023    ESOPHAGOGASTRODUODENOSCOPY performed by Mtaias Baldwin MD at [facility] ENDOSCOPY     Medications Prior to Admission:   No current facility-administered medications on file prior to encounter.     Current Outpatient Medications on File Prior to Encounter   Medication Sig Dispense Refill    famotidine (PEPCID) 20 MG tablet Take 1 tablet by mouth daily      predniSONE (DELTASONE) 10 MG tablet Take 1 tablet by mouth 2 times daily as needed (pain)      docusate (COLACE, DULCOLAX) 100 MG CAPS Take 1 capsule by mouth in the morning and at bedtime      ondansetron (ZOFRAN) 8 MG tablet Take 1 tablet by mouth 3 times daily as needed      pantoprazole (PROTONIX) 40 MG tablet Take 1 tablet by mouth 2 times daily (before meals) 60 tablet 5    sucralfate (CARAFATE) 1 GM tablet Take 1 tablet by mouth 4 times daily 120 tablet 3    oxyCODONE (ROXICODONE) 20 MG immediate release tablet Take 1 tablet by mouth every 6 hours as needed.      butalbital-acetaminophen-caffeine (FIORICET, ESGIC) -40 MG per tablet Take 1 tablet by mouth every 6 hours as needed      atorvastatin (LIPITOR) 20 MG tablet Take 1 tablet by mouth      promethazine (PHENERGAN) 25 MG tablet Take 1 tablet by mouth every 6 hours as needed

## 2024-02-07 NOTE — PROCEDURES
Endoscopy Note    Patient: Jose Gallo  : 1960  Acct#:     Procedure: Esophagogastroduodenoscopy with biopsy    Date:  2024     Surgeon:  Matias Baldwin MD,     Referring Physician:  Zoila Khan MD      Preoperative Diagnosis:    63-year-old male with pyloric channel ulcer is here for reevaluation      Anesthesia: MAC anesthesia      Consent:  The patient or their legal guardian has signed an informed consent, and is aware of the potential risks, benefits, alternatives, and potential complications of this procedure.  These include, but are not limited to hemorrhage, bleeding, post procedural pain, perforation, phlebitis, aspiration, hypotension, hypoxia, cardiovascular events such as arryhthmia, and possibly death.     Description of Procedure: The patient was then taken to the endoscopy suite, placed in the left lateral decubitus position and the above IV sedation was administrered.  The Olympus video endoscope was placed through the patient's oropharynx without difficulty to the extent of the 2nd portion of the duodenum. The patient tolerated the procedure well and was taken to the post anesthesia care unit in good condition.    Complications: None  EBL: none      Findings:  Esophagus:  Visualization of the esophagus demonstrated mucosa..     Stomach:  The scope was then advanced into the stomach.  Both forward and retroflexed views of the stomach were obtained.  Visualization of the gastric body and antrum demonstrated 7 mm white-based ulcer seen or pyloric channel biopsies obtained from the margins.  Mild erythematous mucosa seen in gastric body antrum biopsy obtained.  A retroflexed exam of the gastric cardia and fundus demonstrated normal mucosa.  The pylorus was patent and the scope was advanced into the duodenum.  Duodenum: Visualization of the duodenal bulb and the second portion of the duodenum demonstrated normal mucosa.  The scope was then withdrawn back into the

## 2024-04-05 ENCOUNTER — ANESTHESIA (OUTPATIENT)
Dept: ENDOSCOPY | Age: 64
End: 2024-04-05
Payer: MEDICARE

## 2024-04-05 ENCOUNTER — HOSPITAL ENCOUNTER (OUTPATIENT)
Age: 64
Setting detail: OUTPATIENT SURGERY
Discharge: HOME | End: 2024-04-05
Attending: INTERNAL MEDICINE | Admitting: INTERNAL MEDICINE
Payer: MEDICARE

## 2024-04-05 ENCOUNTER — ANESTHESIA EVENT (OUTPATIENT)
Dept: ENDOSCOPY | Age: 64
End: 2024-04-05
Payer: MEDICARE

## 2024-04-05 VITALS
SYSTOLIC BLOOD PRESSURE: 137 MMHG | TEMPERATURE: 96.9 F | RESPIRATION RATE: 16 BRPM | OXYGEN SATURATION: 100 % | HEART RATE: 66 BPM | DIASTOLIC BLOOD PRESSURE: 93 MMHG

## 2024-04-05 DIAGNOSIS — R11.10 VOMITING, UNSPECIFIED VOMITING TYPE, UNSPECIFIED WHETHER NAUSEA PRESENT: ICD-10-CM

## 2024-04-05 PROCEDURE — 3700000001 HC ADD 15 MINUTES (ANESTHESIA): Performed by: INTERNAL MEDICINE

## 2024-04-05 PROCEDURE — 2580000003 HC RX 258: Performed by: ANESTHESIOLOGY

## 2024-04-05 PROCEDURE — 2500000003 HC RX 250 WO HCPCS

## 2024-04-05 PROCEDURE — 88342 IMHCHEM/IMCYTCHM 1ST ANTB: CPT

## 2024-04-05 PROCEDURE — 88305 TISSUE EXAM BY PATHOLOGIST: CPT

## 2024-04-05 PROCEDURE — 3700000000 HC ANESTHESIA ATTENDED CARE: Performed by: INTERNAL MEDICINE

## 2024-04-05 PROCEDURE — 2709999900 HC NON-CHARGEABLE SUPPLY: Performed by: INTERNAL MEDICINE

## 2024-04-05 PROCEDURE — C1726 CATH, BAL DIL, NON-VASCULAR: HCPCS | Performed by: INTERNAL MEDICINE

## 2024-04-05 PROCEDURE — 7100000011 HC PHASE II RECOVERY - ADDTL 15 MIN: Performed by: INTERNAL MEDICINE

## 2024-04-05 PROCEDURE — 3609017700 HC EGD DILATION GASTRIC/DUODENAL STRICTURE: Performed by: INTERNAL MEDICINE

## 2024-04-05 PROCEDURE — 6360000002 HC RX W HCPCS

## 2024-04-05 PROCEDURE — 7100000010 HC PHASE II RECOVERY - FIRST 15 MIN: Performed by: INTERNAL MEDICINE

## 2024-04-05 RX ORDER — SODIUM CHLORIDE, SODIUM LACTATE, POTASSIUM CHLORIDE, CALCIUM CHLORIDE 600; 310; 30; 20 MG/100ML; MG/100ML; MG/100ML; MG/100ML
INJECTION, SOLUTION INTRAVENOUS CONTINUOUS
Status: DISCONTINUED | OUTPATIENT
Start: 2024-04-05 | End: 2024-04-05 | Stop reason: HOSPADM

## 2024-04-05 RX ORDER — FAMOTIDINE 20 MG/1
20 TABLET, FILM COATED ORAL 2 TIMES DAILY
COMMUNITY

## 2024-04-05 RX ORDER — PROPOFOL 10 MG/ML
INJECTION, EMULSION INTRAVENOUS PRN
Status: DISCONTINUED | OUTPATIENT
Start: 2024-04-05 | End: 2024-04-05 | Stop reason: SDUPTHER

## 2024-04-05 RX ORDER — LIDOCAINE HYDROCHLORIDE 20 MG/ML
INJECTION, SOLUTION INFILTRATION; PERINEURAL PRN
Status: DISCONTINUED | OUTPATIENT
Start: 2024-04-05 | End: 2024-04-05 | Stop reason: SDUPTHER

## 2024-04-05 RX ORDER — GLYCOPYRROLATE 0.2 MG/ML
INJECTION INTRAMUSCULAR; INTRAVENOUS PRN
Status: DISCONTINUED | OUTPATIENT
Start: 2024-04-05 | End: 2024-04-05 | Stop reason: SDUPTHER

## 2024-04-05 RX ADMIN — SODIUM CHLORIDE, POTASSIUM CHLORIDE, SODIUM LACTATE AND CALCIUM CHLORIDE: 600; 310; 30; 20 INJECTION, SOLUTION INTRAVENOUS at 08:32

## 2024-04-05 RX ADMIN — LIDOCAINE HYDROCHLORIDE 100 MG: 20 INJECTION, SOLUTION INFILTRATION; PERINEURAL at 09:49

## 2024-04-05 RX ADMIN — PROPOFOL 100 MG: 10 INJECTION, EMULSION INTRAVENOUS at 09:49

## 2024-04-05 RX ADMIN — PROPOFOL 50 MG: 10 INJECTION, EMULSION INTRAVENOUS at 09:56

## 2024-04-05 RX ADMIN — GLYCOPYRROLATE 0.2 MG: 0.2 INJECTION INTRAMUSCULAR; INTRAVENOUS at 09:49

## 2024-04-05 ASSESSMENT — LIFESTYLE VARIABLES: SMOKING_STATUS: 1

## 2024-04-05 ASSESSMENT — PAIN DESCRIPTION - ONSET: ONSET: ON-GOING

## 2024-04-05 ASSESSMENT — PAIN - FUNCTIONAL ASSESSMENT: PAIN_FUNCTIONAL_ASSESSMENT: 0-10

## 2024-04-05 ASSESSMENT — PAIN DESCRIPTION - PAIN TYPE: TYPE: CHRONIC PAIN

## 2024-04-05 ASSESSMENT — PAIN DESCRIPTION - LOCATION: LOCATION: GENERALIZED

## 2024-04-05 ASSESSMENT — PAIN DESCRIPTION - FREQUENCY: FREQUENCY: CONTINUOUS

## 2024-04-05 ASSESSMENT — PAIN SCALES - GENERAL
PAINLEVEL_OUTOF10: 0
PAINLEVEL_OUTOF10: 8

## 2024-04-05 ASSESSMENT — PAIN DESCRIPTION - DESCRIPTORS: DESCRIPTORS: ACHING

## 2024-04-05 NOTE — PROCEDURES
portion of the duodenum demonstrated normal mucosa.  The scope was then withdrawn back into the stomach, it was decompressed, and the scope was completely withdrawn.    Impression:    Almost completely healed pyloric channel ulcer.  Mild pyloric channel stricture, TTS balloon dilation performed with 10 to 12 mm balloon  Mild diffuse gastritis      Recommendations:   Follow pathology results  Avoid NSAIDs  Continue pantoprazole, Pepcid and Carafate  Recommend low-fat/low residue diet  Follow-up with us in the office in 2 to 3 months          Jermaine Baldwin MD  [facility]  [phone]

## 2024-04-05 NOTE — PROGRESS NOTES
Ambulatory Surgery/Procedure Discharge Note    Vitals:    04/05/24 1024   BP: (!) 137/93   Pulse: 66   Resp: 16   Temp:    SpO2: 100%     Pt meets discharge criteria per Dina score.  In: 300 [I.V.:300]  Out: -     Restroom use offered before discharge.  Yes    Pain assessment:  none  Pain Level: 0    Pt and S.O./family states \"ready to go home\". Pt alert and oriented x4. IV removed. Denies N/V or pain. Pt tolerating po intake. Discharge instructions given to pt and wife with pt permission. Pt and wife verbalized understanding of all instructions. Left with all belongings, and discharge instructions.     Patient discharged to home/self care. Patient discharged via wheel chair by transporter to waiting family/S.O.       4/5/2024 10:32 AM

## 2024-04-05 NOTE — DISCHARGE INSTRUCTIONS
ENDOSCOPY DISCHARGE INSTRUCTIONS:    Call the physician that did your procedure for any questions or concern:    [facility]: [phone]  DR. YVETTE WHARTON        ACTIVITY:    There are potential side effects to the medications used for sedation and anesthesia during your procedure.  These include:  Dizziness or light-headedness, confusion or memory loss, delayed reaction times, loss of coordination, nausea and vomiting.  Because of your increased risk for injury, we ask that you observe the following precautions:  For the next 24 hours,  DO NOT operate an automobile, bicycle, motorcycle, , power tools or large equipment of any kind.  Do not drink alcohol, sign any legal documents or make any legal decisions for 24 hours.  Do not bend your head over lower than your heart.  DO sit on the side of bed/couch awhile before getting up.  Plan on bedrest or quiet relaxation today.  You may resume normal activities in 24 hours.    DIET:    Your first meal today should be light, avoiding spicy and fatty foods.  If you tolerate this first meal, then you may advance to your regular diet unless otherwise advised by your physician.    NORMAL SYMPTOMS:  -Mild sore throat if you’ve had an EGD   -Gaseous discomfort    NOTIFY YOUR PHYSICIAN IF THESE SYMPTOMS OCCUR:  1. Fever (greater than 100)  5. Increased abdominal bloating  2. Severe pain    6. Excessive bleeding  3. Nausea and vomiting  7. Chest pain                                                                    4. Chills    8. Shortness of breath    ADDITIONAL INSTRUCTIONS:    Biopsy results: Call [facility] for biopsy results in 1 week    Educational Information:    Recommendations:   Follow pathology results  Avoid NSAIDs  Continue pantoprazole, Pepcid and Carafate  Recommend low-fat/low residue diet  Follow-up with us in the office in 2 to 3 months      Please review these discharge instructions this evening or tomorrow for  information you may have

## 2024-04-05 NOTE — ANESTHESIA PRE PROCEDURE
Department of Anesthesiology  Preprocedure Note       Name:  Jose Gallo   Age:  63 y.o.  :  1960                                          MRN:  [id number]         Date:  2024      Surgeon: Surgeon(s):  Matias Baldwin MD    Procedure: Procedure(s):  ESOPHAGOGASTRODUODENOSCOPY    Medications prior to admission:   Prior to Admission medications    Medication Sig Start Date End Date Taking? Authorizing Provider   docusate (COLACE, DULCOLAX) 100 MG CAPS Take 1 capsule by mouth in the morning and at bedtime    Phu Radford MD   predniSONE (DELTASONE) 10 MG tablet Take 1 tablet by mouth 2 times daily as needed (pain)    Phu Radford MD   sucralfate (CARAFATE) 1 GM tablet Take 1 tablet by mouth 4 times daily 24   Matias Baldwin MD   famotidine (PEPCID) 40 MG tablet Take 1 tablet by mouth nightly 24   Matias Baldwin MD   pantoprazole (PROTONIX) 40 MG tablet Take 1 tablet by mouth 2 times daily (before meals) 24   Matias Baldwin MD   ondansetron (ZOFRAN) 8 MG tablet Take 1 tablet by mouth 3 times daily as needed 10/31/23   Phu Radford MD   oxyCODONE (ROXICODONE) 20 MG immediate release tablet Take 1 tablet by mouth every 6 hours as needed. 21   Phu Radford MD   butalbital-acetaminophen-caffeine (FIORICET, ESGIC) -40 MG per tablet Take 1 tablet by mouth every 6 hours as needed 21   Phu Radford MD   atorvastatin (LIPITOR) 20 MG tablet Take 1 tablet by mouth 21   Phu Radford MD   promethazine (PHENERGAN) 25 MG tablet Take 1 tablet by mouth every 6 hours as needed 21   Phu Radford MD   FLUoxetine (PROZAC) 20 MG capsule Take 1 capsule by mouth daily    Phu Radford MD   triamcinolone (KENALOG) 0.1 % cream  12/3/21   Phu Radford MD       Current medications:    Current Facility-Administered Medications   Medication Dose Route Frequency Provider Last Rate Last Admin

## 2024-04-05 NOTE — H&P
Pre-operative History and Physical    Patient: Jose Gallo  : 1960  Acct#:     History Obtained From:  patient    HISTORY OF PRESENT ILLNESS:    The patient is a 63 y.o. male who presents with Follow-up gastric ulcer    Past Medical History:        Diagnosis Date    AML (acute myeloblastic leukemia) (HCC)     Hyperlipidemia     Pain management     from  service     Past Surgical History:        Procedure Laterality Date    CHOLECYSTECTOMY      COLONOSCOPY N/A 2023    COLONOSCOPY performed by Matias Baldwin MD at [facility] ENDOSCOPY    GROIN SURGERY      masses removed    HERNIA REPAIR Bilateral     inguinal    UPPER GASTROINTESTINAL ENDOSCOPY N/A 2023    ESOPHAGOGASTRODUODENOSCOPY performed by Matias Baldwin MD at [facility] ENDOSCOPY    UPPER GASTROINTESTINAL ENDOSCOPY N/A 2024    EGD BIOPSY performed by Matias Baldwin MD at [facility] ENDOSCOPY     Medications Prior to Admission:   No current facility-administered medications on file prior to encounter.     Current Outpatient Medications on File Prior to Encounter   Medication Sig Dispense Refill    docusate (COLACE, DULCOLAX) 100 MG CAPS Take 1 capsule by mouth in the morning and at bedtime      predniSONE (DELTASONE) 10 MG tablet Take 1 tablet by mouth 2 times daily as needed (pain)      sucralfate (CARAFATE) 1 GM tablet Take 1 tablet by mouth 4 times daily 120 tablet 5    famotidine (PEPCID) 40 MG tablet Take 1 tablet by mouth nightly 30 tablet 5    pantoprazole (PROTONIX) 40 MG tablet Take 1 tablet by mouth 2 times daily (before meals) 60 tablet 5    ondansetron (ZOFRAN) 8 MG tablet Take 1 tablet by mouth 3 times daily as needed      oxyCODONE (ROXICODONE) 20 MG immediate release tablet Take 1 tablet by mouth every 6 hours as needed.      butalbital-acetaminophen-caffeine (FIORICET, ESGIC) -40 MG per tablet Take 1 tablet by mouth every 6 hours as needed      atorvastatin (LIPITOR) 20 MG tablet Take 1

## 2024-04-05 NOTE — ANESTHESIA POSTPROCEDURE EVALUATION
Department of Anesthesiology  Postprocedure Note    Patient: Jose Gallo  MRN: [id number]  YOB: 1960  Date of evaluation: 4/5/2024    Procedure Summary       Date: 04/05/24 Room / Location: [place] [address] / [facility]    Anesthesia Start: 0945 Anesthesia Stop: 1003    Procedure: ESOPHAGOGASTRODUODENOSCOPY DILATION BALLOON with biopsy Diagnosis:       Vomiting, unspecified vomiting type, unspecified whether nausea present      (Vomiting, unspecified vomiting type, unspecified whether nausea present [R11.10])    Surgeons: Matias Baldwin MD Responsible Provider: Handy Guerra MD    Anesthesia Type: MAC ASA Status: 3            Anesthesia Type: No value filed.    Dina Phase I: Dina Score: 10    Dina Phase II: Dina Score: 10    Anesthesia Post Evaluation    Patient location during evaluation: bedside  Level of consciousness: awake and alert  Airway patency: patent  Nausea & Vomiting: no vomiting  Cardiovascular status: blood pressure returned to baseline  Respiratory status: acceptable  Hydration status: euvolemic  Multimodal analgesia pain management approach  Pain management: adequate    No notable events documented.

## 2024-06-20 ENCOUNTER — HOSPITAL ENCOUNTER (OUTPATIENT)
Dept: CT IMAGING | Age: 64
Discharge: HOME | End: 2024-06-20
Attending: INTERNAL MEDICINE
Payer: MEDICARE

## 2024-06-20 DIAGNOSIS — R10.13 EPIGASTRIC PAIN: ICD-10-CM

## 2024-06-20 DIAGNOSIS — K25.7 CHRONIC PYLORUS ULCER: ICD-10-CM

## 2024-06-20 LAB
PERFORMED ON: NORMAL
POC CREATININE: 0.8 MG/DL (ref 0.8–1.3)
POC SAMPLE TYPE: NORMAL

## 2024-06-20 PROCEDURE — 82565 ASSAY OF CREATININE: CPT

## 2024-06-20 PROCEDURE — 74177 CT ABD & PELVIS W/CONTRAST: CPT

## 2024-06-20 PROCEDURE — 6360000004 HC RX CONTRAST MEDICATION: Performed by: INTERNAL MEDICINE

## 2024-06-20 RX ADMIN — IOPAMIDOL 75 ML: 755 INJECTION, SOLUTION INTRAVENOUS at 13:20

## 2024-06-20 RX ADMIN — DIATRIZOATE MEGLUMINE AND DIATRIZOATE SODIUM 12 ML: 660; 100 LIQUID ORAL; RECTAL at 13:20

## 2024-07-03 ENCOUNTER — HOSPITAL ENCOUNTER (OUTPATIENT)
Age: 64
Setting detail: OUTPATIENT SURGERY
Discharge: HOME | End: 2024-07-03
Attending: INTERNAL MEDICINE | Admitting: INTERNAL MEDICINE
Payer: MEDICARE

## 2024-07-03 ENCOUNTER — ANESTHESIA EVENT (OUTPATIENT)
Dept: ENDOSCOPY | Age: 64
End: 2024-07-03
Payer: MEDICARE

## 2024-07-03 ENCOUNTER — ANESTHESIA (OUTPATIENT)
Dept: ENDOSCOPY | Age: 64
End: 2024-07-03
Payer: MEDICARE

## 2024-07-03 VITALS
WEIGHT: 177 LBS | SYSTOLIC BLOOD PRESSURE: 126 MMHG | RESPIRATION RATE: 16 BRPM | TEMPERATURE: 97.1 F | DIASTOLIC BLOOD PRESSURE: 86 MMHG | HEART RATE: 57 BPM | BODY MASS INDEX: 22.01 KG/M2 | OXYGEN SATURATION: 96 % | HEIGHT: 75 IN

## 2024-07-03 DIAGNOSIS — R11.10 VOMITING, UNSPECIFIED VOMITING TYPE, UNSPECIFIED WHETHER NAUSEA PRESENT: ICD-10-CM

## 2024-07-03 DIAGNOSIS — K25.7 CHRONIC PYLORUS ULCER: ICD-10-CM

## 2024-07-03 LAB
ALBUMIN SERPL-MCNC: 3.6 G/DL (ref 3.4–5)
ALBUMIN/GLOB SERPL: 2 {RATIO} (ref 1.1–2.2)
ALP SERPL-CCNC: 78 U/L (ref 40–129)
ALT SERPL-CCNC: 7 U/L (ref 10–40)
ANION GAP SERPL CALCULATED.3IONS-SCNC: 9 MMOL/L (ref 3–16)
AST SERPL-CCNC: 13 U/L (ref 15–37)
BASOPHILS # BLD: 0 K/UL (ref 0–0.2)
BASOPHILS NFR BLD: 0.6 %
BILIRUB SERPL-MCNC: <0.2 MG/DL (ref 0–1)
BUN SERPL-MCNC: 13 MG/DL (ref 7–20)
CALCIUM SERPL-MCNC: 8.2 MG/DL (ref 8.3–10.6)
CHLORIDE SERPL-SCNC: 107 MMOL/L (ref 99–110)
CO2 SERPL-SCNC: 24 MMOL/L (ref 21–32)
CREAT SERPL-MCNC: 0.9 MG/DL (ref 0.8–1.3)
CRP SERPL-MCNC: 5.2 MG/L (ref 0–5.1)
DEPRECATED RDW RBC AUTO: 15.9 % (ref 12.4–15.4)
EOSINOPHIL # BLD: 0.2 K/UL (ref 0–0.6)
EOSINOPHIL NFR BLD: 3.4 %
GFR SERPLBLD CREATININE-BSD FMLA CKD-EPI: >90 ML/MIN/{1.73_M2}
GLUCOSE SERPL-MCNC: 99 MG/DL (ref 70–99)
HCT VFR BLD AUTO: 31.9 % (ref 40.5–52.5)
HGB BLD-MCNC: 10.7 G/DL (ref 13.5–17.5)
IGA SERPL-MCNC: 116 MG/DL (ref 70–400)
LYMPHOCYTES # BLD: 1.5 K/UL (ref 1–5.1)
LYMPHOCYTES NFR BLD: 22.7 %
MCH RBC QN AUTO: 29.3 PG (ref 26–34)
MCHC RBC AUTO-ENTMCNC: 33.4 G/DL (ref 31–36)
MCV RBC AUTO: 87.7 FL (ref 80–100)
MONOCYTES # BLD: 0.3 K/UL (ref 0–1.3)
MONOCYTES NFR BLD: 5.3 %
NEUTROPHILS # BLD: 4.4 K/UL (ref 1.7–7.7)
NEUTROPHILS NFR BLD: 68 %
PLATELET # BLD AUTO: 162 K/UL (ref 135–450)
PMV BLD AUTO: 6.6 FL (ref 5–10.5)
POTASSIUM SERPL-SCNC: 3.7 MMOL/L (ref 3.5–5.1)
PROT SERPL-MCNC: 5.4 G/DL (ref 6.4–8.2)
RBC # BLD AUTO: 3.63 M/UL (ref 4.2–5.9)
SODIUM SERPL-SCNC: 140 MMOL/L (ref 136–145)
WBC # BLD AUTO: 6.5 K/UL (ref 4–11)

## 2024-07-03 PROCEDURE — C1726 CATH, BAL DIL, NON-VASCULAR: HCPCS | Performed by: INTERNAL MEDICINE

## 2024-07-03 PROCEDURE — 2709999900 HC NON-CHARGEABLE SUPPLY: Performed by: INTERNAL MEDICINE

## 2024-07-03 PROCEDURE — 2500000003 HC RX 250 WO HCPCS: Performed by: NURSE ANESTHETIST, CERTIFIED REGISTERED

## 2024-07-03 PROCEDURE — 85025 COMPLETE CBC W/AUTO DIFF WBC: CPT

## 2024-07-03 PROCEDURE — 3700000000 HC ANESTHESIA ATTENDED CARE: Performed by: INTERNAL MEDICINE

## 2024-07-03 PROCEDURE — 7100000010 HC PHASE II RECOVERY - FIRST 15 MIN: Performed by: INTERNAL MEDICINE

## 2024-07-03 PROCEDURE — 3700000001 HC ADD 15 MINUTES (ANESTHESIA): Performed by: INTERNAL MEDICINE

## 2024-07-03 PROCEDURE — 2580000003 HC RX 258: Performed by: ANESTHESIOLOGY

## 2024-07-03 PROCEDURE — 80053 COMPREHEN METABOLIC PANEL: CPT

## 2024-07-03 PROCEDURE — 82941 ASSAY OF GASTRIN: CPT

## 2024-07-03 PROCEDURE — 88305 TISSUE EXAM BY PATHOLOGIST: CPT

## 2024-07-03 PROCEDURE — 6360000002 HC RX W HCPCS: Performed by: NURSE ANESTHETIST, CERTIFIED REGISTERED

## 2024-07-03 PROCEDURE — 82784 ASSAY IGA/IGD/IGG/IGM EACH: CPT

## 2024-07-03 PROCEDURE — 86140 C-REACTIVE PROTEIN: CPT

## 2024-07-03 PROCEDURE — 83516 IMMUNOASSAY NONANTIBODY: CPT

## 2024-07-03 PROCEDURE — 3609012400 HC EGD TRANSORAL BIOPSY SINGLE/MULTIPLE: Performed by: INTERNAL MEDICINE

## 2024-07-03 PROCEDURE — 7100000011 HC PHASE II RECOVERY - ADDTL 15 MIN: Performed by: INTERNAL MEDICINE

## 2024-07-03 RX ORDER — NALOXONE HYDROCHLORIDE 0.4 MG/ML
INJECTION, SOLUTION INTRAMUSCULAR; INTRAVENOUS; SUBCUTANEOUS PRN
Status: CANCELLED | OUTPATIENT
Start: 2024-07-03

## 2024-07-03 RX ORDER — LORAZEPAM 2 MG/ML
0.5 INJECTION INTRAMUSCULAR
Status: CANCELLED | OUTPATIENT
Start: 2024-07-03 | End: 2024-07-04

## 2024-07-03 RX ORDER — PROCHLORPERAZINE EDISYLATE 5 MG/ML
5 INJECTION INTRAMUSCULAR; INTRAVENOUS
Status: CANCELLED | OUTPATIENT
Start: 2024-07-03 | End: 2024-07-04

## 2024-07-03 RX ORDER — IPRATROPIUM BROMIDE AND ALBUTEROL SULFATE 2.5; .5 MG/3ML; MG/3ML
1 SOLUTION RESPIRATORY (INHALATION)
Status: CANCELLED | OUTPATIENT
Start: 2024-07-03 | End: 2024-07-04

## 2024-07-03 RX ORDER — SODIUM CHLORIDE 0.9 % (FLUSH) 0.9 %
5-40 SYRINGE (ML) INJECTION EVERY 12 HOURS SCHEDULED
Status: CANCELLED | OUTPATIENT
Start: 2024-07-03

## 2024-07-03 RX ORDER — LABETALOL HYDROCHLORIDE 5 MG/ML
10 INJECTION, SOLUTION INTRAVENOUS
Status: CANCELLED | OUTPATIENT
Start: 2024-07-03

## 2024-07-03 RX ORDER — SODIUM CHLORIDE, SODIUM LACTATE, POTASSIUM CHLORIDE, CALCIUM CHLORIDE 600; 310; 30; 20 MG/100ML; MG/100ML; MG/100ML; MG/100ML
INJECTION, SOLUTION INTRAVENOUS CONTINUOUS
Status: DISCONTINUED | OUTPATIENT
Start: 2024-07-03 | End: 2024-07-03 | Stop reason: HOSPADM

## 2024-07-03 RX ORDER — PROPOFOL 10 MG/ML
INJECTION, EMULSION INTRAVENOUS PRN
Status: DISCONTINUED | OUTPATIENT
Start: 2024-07-03 | End: 2024-07-03 | Stop reason: SDUPTHER

## 2024-07-03 RX ORDER — GLYCOPYRROLATE 0.2 MG/ML
INJECTION INTRAMUSCULAR; INTRAVENOUS PRN
Status: DISCONTINUED | OUTPATIENT
Start: 2024-07-03 | End: 2024-07-03 | Stop reason: SDUPTHER

## 2024-07-03 RX ORDER — SODIUM CHLORIDE 9 MG/ML
INJECTION, SOLUTION INTRAVENOUS PRN
Status: CANCELLED | OUTPATIENT
Start: 2024-07-03

## 2024-07-03 RX ORDER — ONDANSETRON 2 MG/ML
4 INJECTION INTRAMUSCULAR; INTRAVENOUS
Status: CANCELLED | OUTPATIENT
Start: 2024-07-03 | End: 2024-07-04

## 2024-07-03 RX ORDER — SODIUM CHLORIDE 0.9 % (FLUSH) 0.9 %
5-40 SYRINGE (ML) INJECTION PRN
Status: CANCELLED | OUTPATIENT
Start: 2024-07-03

## 2024-07-03 RX ORDER — HYDROMORPHONE HYDROCHLORIDE 1 MG/ML
0.5 INJECTION, SOLUTION INTRAMUSCULAR; INTRAVENOUS; SUBCUTANEOUS EVERY 5 MIN PRN
Status: CANCELLED | OUTPATIENT
Start: 2024-07-03

## 2024-07-03 RX ORDER — FENTANYL CITRATE 50 UG/ML
25 INJECTION, SOLUTION INTRAMUSCULAR; INTRAVENOUS EVERY 5 MIN PRN
Status: CANCELLED | OUTPATIENT
Start: 2024-07-03

## 2024-07-03 RX ORDER — MEPERIDINE HYDROCHLORIDE 25 MG/ML
12.5 INJECTION INTRAMUSCULAR; INTRAVENOUS; SUBCUTANEOUS EVERY 5 MIN PRN
Status: CANCELLED | OUTPATIENT
Start: 2024-07-03

## 2024-07-03 RX ORDER — SULFAMETHOXAZOLE AND TRIMETHOPRIM 800; 160 MG/1; MG/1
TABLET ORAL
COMMUNITY
Start: 2024-07-01

## 2024-07-03 RX ORDER — LIDOCAINE HYDROCHLORIDE 20 MG/ML
INJECTION, SOLUTION EPIDURAL; INFILTRATION; INTRACAUDAL; PERINEURAL PRN
Status: DISCONTINUED | OUTPATIENT
Start: 2024-07-03 | End: 2024-07-03 | Stop reason: SDUPTHER

## 2024-07-03 RX ORDER — DIPHENHYDRAMINE HYDROCHLORIDE 50 MG/ML
12.5 INJECTION INTRAMUSCULAR; INTRAVENOUS
Status: CANCELLED | OUTPATIENT
Start: 2024-07-03 | End: 2024-07-04

## 2024-07-03 RX ADMIN — LIDOCAINE HYDROCHLORIDE 100 MG: 20 INJECTION, SOLUTION EPIDURAL; INFILTRATION; INTRACAUDAL; PERINEURAL at 08:13

## 2024-07-03 RX ADMIN — PROPOFOL 100 MG: 10 INJECTION, EMULSION INTRAVENOUS at 08:13

## 2024-07-03 RX ADMIN — PROPOFOL 150 MCG/KG/MIN: 10 INJECTION, EMULSION INTRAVENOUS at 08:12

## 2024-07-03 RX ADMIN — SODIUM CHLORIDE, POTASSIUM CHLORIDE, SODIUM LACTATE AND CALCIUM CHLORIDE: 600; 310; 30; 20 INJECTION, SOLUTION INTRAVENOUS at 07:27

## 2024-07-03 RX ADMIN — GLYCOPYRROLATE 0.2 MG: 0.2 INJECTION INTRAMUSCULAR; INTRAVENOUS at 08:13

## 2024-07-03 ASSESSMENT — PAIN SCALES - GENERAL
PAINLEVEL_OUTOF10: 0

## 2024-07-03 NOTE — ANESTHESIA POSTPROCEDURE EVALUATION
Department of Anesthesiology  Postprocedure Note    Patient: Jose Gallo  MRN: [id number]  YOB: 1960  Date of evaluation: 7/3/2024    Procedure Summary       Date: 07/03/24 Room / Location: [place] [address] / [facility]    Anesthesia Start: 0808 Anesthesia Stop: 0833    Procedure: ESOPHAGOGASTRODUODENOSCOPY BIOPSY WITH BALLOON DILATION Diagnosis:       Vomiting, unspecified vomiting type, unspecified whether nausea present      Chronic pylorus ulcer      (Vomiting, unspecified vomiting type, unspecified whether nausea present [R11.10])      (Chronic pylorus ulcer [K25.7])    Surgeons: Matias Baldwin MD Responsible Provider: Nelson Gu MD    Anesthesia Type: MAC ASA Status: 2            Anesthesia Type: MAC    Dina Phase I: Dina Score: 10    Dina Phase II: Dina Score: 10    Anesthesia Post Evaluation    Patient location during evaluation: PACU  Patient participation: complete - patient participated  Level of consciousness: awake  Airway patency: patent  Nausea & Vomiting: no nausea and no vomiting  Cardiovascular status: blood pressure returned to baseline and hemodynamically stable  Respiratory status: acceptable  Hydration status: euvolemic  Multimodal analgesia pain management approach  Pain management: adequate    No notable events documented.

## 2024-07-03 NOTE — H&P
Pre-operative History and Physical    Patient: Jose Gallo  : 1960  Acct#:     History Obtained From:  patient    HISTORY OF PRESENT ILLNESS:    The patient is a 64 y.o. male who presents with  nausea, vomiting, pyloric stricture , epigastric pain    Past Medical History:        Diagnosis Date    AML (acute myeloblastic leukemia) (HCC)     Hyperlipidemia     Pain management     from  service     Past Surgical History:        Procedure Laterality Date    CHOLECYSTECTOMY      COLONOSCOPY N/A 2023    COLONOSCOPY performed by Matias Baldwin MD at [facility] ENDOSCOPY    GROIN SURGERY      masses removed    HERNIA REPAIR Bilateral     inguinal    UPPER GASTROINTESTINAL ENDOSCOPY N/A 2023    ESOPHAGOGASTRODUODENOSCOPY performed by Matias Baldwin MD at [facility] ENDOSCOPY    UPPER GASTROINTESTINAL ENDOSCOPY N/A 2024    EGD BIOPSY performed by Matias Baldwin MD at [facility] ENDOSCOPY    UPPER GASTROINTESTINAL ENDOSCOPY N/A 2024    ESOPHAGOGASTRODUODENOSCOPY DILATION BALLOON with biopsy performed by Matias Baldwin MD at [facility] ENDOSCOPY     Medications Prior to Admission:   No current facility-administered medications on file prior to encounter.     Current Outpatient Medications on File Prior to Encounter   Medication Sig Dispense Refill    BACTRIM -160 MG per tablet Take 1 tablet every 12 hours by oral route for 10 days.      famotidine (PEPCID) 20 MG tablet Take 1 tablet by mouth 2 times daily      predniSONE (DELTASONE) 10 MG tablet Take 1 tablet by mouth 2 times daily as needed (pain)      sucralfate (CARAFATE) 1 GM tablet Take 1 tablet by mouth 4 times daily 120 tablet 5    famotidine (PEPCID) 40 MG tablet Take 1 tablet by mouth nightly 30 tablet 5    pantoprazole (PROTONIX) 40 MG tablet Take 1 tablet by mouth 2 times daily (before meals) 60 tablet 5    ondansetron (ZOFRAN) 8 MG tablet Take 1 tablet by mouth 3 times daily as needed      oxyCODONE  (ROXICODONE) 20 MG immediate release tablet Take 1 tablet by mouth every 6 hours as needed.      butalbital-acetaminophen-caffeine (FIORICET, ESGIC) -40 MG per tablet Take 1 tablet by mouth every 6 hours as needed      atorvastatin (LIPITOR) 20 MG tablet Take 1 tablet by mouth      promethazine (PHENERGAN) 25 MG tablet Take 1 tablet by mouth every 6 hours as needed      FLUoxetine (PROZAC) 20 MG capsule Take 1 capsule by mouth daily      triamcinolone (KENALOG) 0.1 % cream        Allergies:  Latex, Cinnamon, Gabapentin, Nutmeg oil (myristica oil), and Vicodin [hydrocodone-acetaminophen]    History of allergic reaction to anesthesia:  No      PHYSICAL EXAM:      /88   Pulse 60   Temp 97.6 °F (36.4 °C) (Temporal)   Resp 16   Ht 1.905 m (6' 3\")   Wt 80.3 kg (177 lb)   SpO2 97%   BMI 22.12 kg/m²  I        Heart:  heart sounds normal  Lungs: Breath sounds clear  Abdomen:  soft, non tender      ASA Grade:  ASA 2 - Patient with mild systemic disease with no functional limitations      ASSESSMENT AND PLAN:    1.  Patient is a 64 y.o. male here for EGD with deep sedation  2.  Procedure options, risks and benefits reviewed with patient.  Patient expresses understanding.          Jermaine Baldwin MD  [facility]  [phone]

## 2024-07-03 NOTE — ANESTHESIA PRE PROCEDURE
\"HEPCAB\"    COVID-19 Screening (If Applicable): No results found for: \"COVID19\"        Anesthesia Evaluation    Airway: Mallampati: II  TM distance: >3 FB   Neck ROM: full  Mouth opening: > = 3 FB   Dental:          Pulmonary:                              Cardiovascular:            Rhythm: regular  Rate: normal                    Neuro/Psych:               GI/Hepatic/Renal:             Endo/Other:                     Abdominal:             Vascular:          Other Findings:             Anesthesia Plan      MAC     ASA 2       Induction: intravenous.      Anesthetic plan and risks discussed with patient.      Plan discussed with CRNA.                    MYA LUO MD   7/3/2024

## 2024-07-03 NOTE — DISCHARGE INSTRUCTIONS
ENDOSCOPY DISCHARGE INSTRUCTIONS:    Call the physician that did your procedure for any questions or concern:    [facility]: [phone]  DR. YVETTE WHARTON        ACTIVITY:    There are potential side effects to the medications used for sedation and anesthesia during your procedure.  These include:  Dizziness or light-headedness, confusion or memory loss, delayed reaction times, loss of coordination, nausea and vomiting.  Because of your increased risk for injury, we ask that you observe the following precautions:  For the next 24 hours,  DO NOT operate an automobile, bicycle, motorcycle, , power tools or large equipment of any kind.  Do not drink alcohol, sign any legal documents or make any legal decisions for 24 hours.  Do not bend your head over lower than your heart.  DO sit on the side of bed/couch awhile before getting up.  Plan on bedrest or quiet relaxation today.  You may resume normal activities in 24 hours.    DIET:    Your first meal today should be light, avoiding spicy and fatty foods.  If you tolerate this first meal, then you may advance to your regular diet unless otherwise advised by your physician.    NORMAL SYMPTOMS:  -Mild sore throat if you’ve had an EGD   -Gaseous discomfort    NOTIFY YOUR PHYSICIAN IF THESE SYMPTOMS OCCUR:  1. Fever (greater than 100)  5. Increased abdominal bloating  2. Severe pain    6. Excessive bleeding  3. Nausea and vomiting  7. Chest pain                                                                    4. Chills    8. Shortness of breath    ADDITIONAL INSTRUCTIONS:    Biopsy results: Call [facility] for biopsy results in 1 week         Upper GI Endoscopy: What to Expect at Home  Your Recovery  You had an upper GI endoscopy. Your doctor used a thin, lighted tube that bends to look at the inside of your esophagus, your stomach, and the first part of the small intestine, called the duodenum.  After you have an endoscopy, you will stay at the

## 2024-07-03 NOTE — PROGRESS NOTES
Ambulatory Surgery/Procedure Discharge Note    Vitals:    07/03/24 0855   BP: 127/86   Pulse: 51   Resp: 16   Temp:    SpO2: 97%       In: 400 [I.V.:400]  Out: -     Restroom use offered before discharge.  Yes    Pain assessment:  level of pain (1-10, 10 severe),   Pain Level: 0    Pt and S.O./family states \"ready to go home\". Pt alert and oriented x4. IV removed. Denies N/V or pain. Voided prior to discharge. Pt tolerating po intake. Discharge instructions given to pt and wife with pt permission. Pt and wife verbalized understanding of all instructions. Left with all belongings, prescriptions, and discharge instructions.     Patient discharged to home/self care. Patient discharged via wheel chair by transporter to waiting family/S.O.       7/3/2024 9:01 AM

## 2024-07-03 NOTE — PROCEDURES
PROCEDURE NOTE  Date: 7/3/2024   Name: Jose Gallo  YOB: 1960    Procedures  EGD, BIOPSY                Endoscopy Note    Patient: Jose Gallo  : 1960  Acct#:     Procedure: Esophagogastroduodenoscopy with biopsy    Date:  7/3/2024     Surgeon:  Matias Baldwin MD,       Preoperative Diagnosis:     64-year-old male is here for EGD with history of refractory pyloric channel ulcer and stricture        Anesthesia: MAC anesthesia      Consent:  The patient or their legal guardian has signed an informed consent, and is aware of the potential risks, benefits, alternatives, and potential complications of this procedure.  These include, but are not limited to hemorrhage, bleeding, post procedural pain, perforation, phlebitis, aspiration, hypotension, hypoxia, cardiovascular events such as arryhthmia, and possibly death.     Description of Procedure: The patient was then taken to the endoscopy suite, placed in the left lateral decubitus position and the above IV sedation was administrered.  The Olympus video endoscope was placed through the patient's oropharynx without difficulty to the extent of the 2nd portion of the duodenum. The patient tolerated the procedure well and was taken to the post anesthesia care unit in good condition.    Complications: None  EBL: none      Findings:  Esophagus:  Visualization of the esophagus demonstrated normal mucosa.  GE junction at 40 cm with 2 cm sliding hiatal hernia.     Stomach:  The scope was then advanced into the stomach.  Both forward and retroflexed views of the stomach were obtained.  Visualization of the gastric body and antrum demonstrated mild erythematous mucosa, biopsy obtained normal mucosa.  7 mm pyloric channel ulcer seen, multiple biopsies obtained.  Pyloric channel dilation performed with TTS balloon 10 mm to 12 mm balloon.  A retroflexed exam of the gastric cardia and fundus demonstrated normal mucosa.    Duodenum: Visualization of the

## 2024-07-04 LAB
GASTRIN SERPL-MCNC: 126 PG/ML (ref 0–100)
TISSUE TRANSGLUTAMINASE IGA: <0.5 U/ML (ref 0–14)

## 2024-08-07 ENCOUNTER — HOSPITAL ENCOUNTER (OUTPATIENT)
Dept: CT IMAGING | Age: 64
Discharge: HOME | End: 2024-08-07
Attending: INTERNAL MEDICINE
Payer: MEDICARE

## 2024-08-07 ENCOUNTER — HOSPITAL ENCOUNTER (OUTPATIENT)
Age: 64
Discharge: HOME | End: 2024-08-07
Attending: INTERNAL MEDICINE
Payer: MEDICARE

## 2024-08-07 DIAGNOSIS — R07.89 OTHER CHEST PAIN: ICD-10-CM

## 2024-08-07 PROCEDURE — 83540 ASSAY OF IRON: CPT

## 2024-08-07 PROCEDURE — 83550 IRON BINDING TEST: CPT

## 2024-08-07 PROCEDURE — 82784 ASSAY IGA/IGD/IGG/IGM EACH: CPT

## 2024-08-07 PROCEDURE — 85025 COMPLETE CBC W/AUTO DIFF WBC: CPT

## 2024-08-07 PROCEDURE — 82728 ASSAY OF FERRITIN: CPT

## 2024-08-07 PROCEDURE — 82746 ASSAY OF FOLIC ACID SERUM: CPT

## 2024-08-07 PROCEDURE — 71260 CT THORAX DX C+: CPT

## 2024-08-07 PROCEDURE — 6360000004 HC RX CONTRAST MEDICATION: Performed by: INTERNAL MEDICINE

## 2024-08-07 PROCEDURE — 86140 C-REACTIVE PROTEIN: CPT

## 2024-08-07 PROCEDURE — 83516 IMMUNOASSAY NONANTIBODY: CPT

## 2024-08-07 PROCEDURE — 82607 VITAMIN B-12: CPT

## 2024-08-07 PROCEDURE — 80053 COMPREHEN METABOLIC PANEL: CPT

## 2024-08-07 RX ADMIN — IOPAMIDOL 75 ML: 755 INJECTION, SOLUTION INTRAVENOUS at 15:47

## 2024-08-08 LAB
ALBUMIN SERPL-MCNC: 4.3 G/DL (ref 3.4–5)
ALBUMIN/GLOB SERPL: 2 {RATIO} (ref 1.1–2.2)
ALP SERPL-CCNC: 68 U/L (ref 40–129)
ALT SERPL-CCNC: 18 U/L (ref 10–40)
ANION GAP SERPL CALCULATED.3IONS-SCNC: 12 MMOL/L (ref 3–16)
AST SERPL-CCNC: 26 U/L (ref 15–37)
BASOPHILS # BLD: 0.1 K/UL (ref 0–0.2)
BASOPHILS NFR BLD: 0.6 %
BILIRUB SERPL-MCNC: <0.2 MG/DL (ref 0–1)
BUN SERPL-MCNC: 15 MG/DL (ref 7–20)
CALCIUM SERPL-MCNC: 8.8 MG/DL (ref 8.3–10.6)
CHLORIDE SERPL-SCNC: 102 MMOL/L (ref 99–110)
CO2 SERPL-SCNC: 26 MMOL/L (ref 21–32)
CREAT SERPL-MCNC: 0.7 MG/DL (ref 0.8–1.3)
CRP SERPL-MCNC: <3 MG/L (ref 0–5.1)
DEPRECATED RDW RBC AUTO: 16.7 % (ref 12.4–15.4)
EOSINOPHIL # BLD: 0.3 K/UL (ref 0–0.6)
EOSINOPHIL NFR BLD: 3.4 %
FERRITIN SERPL IA-MCNC: 10.2 NG/ML (ref 30–400)
FOLATE SERPL-MCNC: >20 NG/ML (ref 4.78–24.2)
GFR SERPLBLD CREATININE-BSD FMLA CKD-EPI: >90 ML/MIN/{1.73_M2}
GLUCOSE SERPL-MCNC: 107 MG/DL (ref 70–99)
HCT VFR BLD AUTO: 34.1 % (ref 40.5–52.5)
HGB BLD-MCNC: 11.6 G/DL (ref 13.5–17.5)
IGA SERPL-MCNC: 134 MG/DL (ref 70–400)
IRON SATN MFR SERPL: 14 % (ref 20–50)
IRON SERPL-MCNC: 50 UG/DL (ref 59–158)
LYMPHOCYTES # BLD: 2.9 K/UL (ref 1–5.1)
LYMPHOCYTES NFR BLD: 28.2 %
MCH RBC QN AUTO: 29.7 PG (ref 26–34)
MCHC RBC AUTO-ENTMCNC: 34.1 G/DL (ref 31–36)
MCV RBC AUTO: 87.2 FL (ref 80–100)
MONOCYTES # BLD: 0.5 K/UL (ref 0–1.3)
MONOCYTES NFR BLD: 4.4 %
NEUTROPHILS # BLD: 6.6 K/UL (ref 1.7–7.7)
NEUTROPHILS NFR BLD: 63.4 %
PLATELET # BLD AUTO: 211 K/UL (ref 135–450)
PMV BLD AUTO: 6.9 FL (ref 5–10.5)
POTASSIUM SERPL-SCNC: 3.8 MMOL/L (ref 3.5–5.1)
PROT SERPL-MCNC: 6.5 G/DL (ref 6.4–8.2)
RBC # BLD AUTO: 3.9 M/UL (ref 4.2–5.9)
SODIUM SERPL-SCNC: 140 MMOL/L (ref 136–145)
VIT B12 SERPL-MCNC: 1332 PG/ML (ref 211–911)
WBC # BLD AUTO: 10.4 K/UL (ref 4–11)

## 2024-08-09 LAB — TISSUE TRANSGLUTAMINASE IGA: <0.5 U/ML (ref 0–14)

## 2024-12-24 ENCOUNTER — HOSPITAL ENCOUNTER (EMERGENCY)
Age: 64
Discharge: HOME | End: 2024-12-25
Payer: MEDICARE

## 2024-12-24 ENCOUNTER — APPOINTMENT (OUTPATIENT)
Dept: CT IMAGING | Age: 64
End: 2024-12-24
Payer: MEDICARE

## 2024-12-24 DIAGNOSIS — K63.9 MURAL THICKENING OF SIGMOID COLON: Primary | ICD-10-CM

## 2024-12-24 DIAGNOSIS — R11.2 NAUSEA AND VOMITING, UNSPECIFIED VOMITING TYPE: ICD-10-CM

## 2024-12-24 LAB
ALBUMIN SERPL-MCNC: 4.3 G/DL (ref 3.4–5)
ALBUMIN/GLOB SERPL: 1.7 {RATIO} (ref 1.1–2.2)
ALP SERPL-CCNC: 99 U/L (ref 40–129)
ALT SERPL-CCNC: 11 U/L (ref 10–40)
ANION GAP SERPL CALCULATED.3IONS-SCNC: 19 MMOL/L (ref 3–16)
AST SERPL-CCNC: 19 U/L (ref 15–37)
BASOPHILS # BLD: 0.1 K/UL (ref 0–0.2)
BASOPHILS NFR BLD: 0.8 %
BILIRUB SERPL-MCNC: 0.8 MG/DL (ref 0–1)
BUN SERPL-MCNC: 13 MG/DL (ref 7–20)
CALCIUM SERPL-MCNC: 9.2 MG/DL (ref 8.3–10.6)
CHLORIDE SERPL-SCNC: 105 MMOL/L (ref 99–110)
CO2 SERPL-SCNC: 20 MMOL/L (ref 21–32)
CREAT SERPL-MCNC: 0.9 MG/DL (ref 0.8–1.3)
DEPRECATED RDW RBC AUTO: 13.6 % (ref 12.4–15.4)
EOSINOPHIL # BLD: 0 K/UL (ref 0–0.6)
EOSINOPHIL NFR BLD: 0 %
FLUAV RNA RESP QL NAA+PROBE: NOT DETECTED
FLUBV RNA RESP QL NAA+PROBE: NOT DETECTED
GFR SERPLBLD CREATININE-BSD FMLA CKD-EPI: >90 ML/MIN/{1.73_M2}
GLUCOSE SERPL-MCNC: 159 MG/DL (ref 70–99)
HCT VFR BLD AUTO: 43.8 % (ref 40.5–52.5)
HGB BLD-MCNC: 15.2 G/DL (ref 13.5–17.5)
LYMPHOCYTES # BLD: 2 K/UL (ref 1–5.1)
LYMPHOCYTES NFR BLD: 16.8 %
MCH RBC QN AUTO: 33.3 PG (ref 26–34)
MCHC RBC AUTO-ENTMCNC: 34.8 G/DL (ref 31–36)
MCV RBC AUTO: 95.8 FL (ref 80–100)
MONOCYTES # BLD: 0.5 K/UL (ref 0–1.3)
MONOCYTES NFR BLD: 3.8 %
NEUTROPHILS # BLD: 9.4 K/UL (ref 1.7–7.7)
NEUTROPHILS NFR BLD: 78.6 %
PLATELET # BLD AUTO: 241 K/UL (ref 135–450)
PMV BLD AUTO: 6.4 FL (ref 5–10.5)
POTASSIUM SERPL-SCNC: 3.8 MMOL/L (ref 3.5–5.1)
PROT SERPL-MCNC: 6.9 G/DL (ref 6.4–8.2)
RBC # BLD AUTO: 4.57 M/UL (ref 4.2–5.9)
SARS-COV-2 RNA RESP QL NAA+PROBE: NOT DETECTED
SODIUM SERPL-SCNC: 144 MMOL/L (ref 136–145)
WBC # BLD AUTO: 12 K/UL (ref 4–11)

## 2024-12-24 PROCEDURE — 2580000003 HC RX 258

## 2024-12-24 PROCEDURE — 85025 COMPLETE CBC W/AUTO DIFF WBC: CPT

## 2024-12-24 PROCEDURE — 80053 COMPREHEN METABOLIC PANEL: CPT

## 2024-12-24 PROCEDURE — 96374 THER/PROPH/DIAG INJ IV PUSH: CPT

## 2024-12-24 PROCEDURE — 6360000002 HC RX W HCPCS

## 2024-12-24 PROCEDURE — 96375 TX/PRO/DX INJ NEW DRUG ADDON: CPT

## 2024-12-24 PROCEDURE — 87636 SARSCOV2 & INF A&B AMP PRB: CPT

## 2024-12-24 PROCEDURE — 2500000003 HC RX 250 WO HCPCS

## 2024-12-24 PROCEDURE — 74177 CT ABD & PELVIS W/CONTRAST: CPT

## 2024-12-24 PROCEDURE — 6360000004 HC RX CONTRAST MEDICATION

## 2024-12-24 PROCEDURE — 99285 EMERGENCY DEPT VISIT HI MDM: CPT

## 2024-12-24 RX ORDER — SODIUM CHLORIDE, SODIUM LACTATE, POTASSIUM CHLORIDE, AND CALCIUM CHLORIDE .6; .31; .03; .02 G/100ML; G/100ML; G/100ML; G/100ML
1000 INJECTION, SOLUTION INTRAVENOUS ONCE
Status: COMPLETED | OUTPATIENT
Start: 2024-12-24 | End: 2024-12-25

## 2024-12-24 RX ORDER — ONDANSETRON 2 MG/ML
4 INJECTION INTRAMUSCULAR; INTRAVENOUS ONCE
Status: COMPLETED | OUTPATIENT
Start: 2024-12-24 | End: 2024-12-24

## 2024-12-24 RX ORDER — IOPAMIDOL 755 MG/ML
75 INJECTION, SOLUTION INTRAVASCULAR
Status: COMPLETED | OUTPATIENT
Start: 2024-12-24 | End: 2024-12-24

## 2024-12-24 RX ORDER — PANTOPRAZOLE SODIUM 40 MG/10ML
80 INJECTION, POWDER, LYOPHILIZED, FOR SOLUTION INTRAVENOUS ONCE
Status: COMPLETED | OUTPATIENT
Start: 2024-12-24 | End: 2024-12-24

## 2024-12-24 RX ADMIN — ONDANSETRON 4 MG: 2 INJECTION INTRAMUSCULAR; INTRAVENOUS at 22:37

## 2024-12-24 RX ADMIN — HYDROMORPHONE HYDROCHLORIDE 1 MG: 1 INJECTION, SOLUTION INTRAMUSCULAR; INTRAVENOUS; SUBCUTANEOUS at 22:39

## 2024-12-24 RX ADMIN — IOPAMIDOL 75 ML: 755 INJECTION, SOLUTION INTRAVENOUS at 22:54

## 2024-12-24 RX ADMIN — PANTOPRAZOLE SODIUM 80 MG: 40 INJECTION, POWDER, FOR SOLUTION INTRAVENOUS at 22:41

## 2024-12-24 RX ADMIN — SODIUM CHLORIDE, POTASSIUM CHLORIDE, SODIUM LACTATE AND CALCIUM CHLORIDE 1000 ML: 600; 310; 30; 20 INJECTION, SOLUTION INTRAVENOUS at 23:34

## 2024-12-24 ASSESSMENT — PAIN DESCRIPTION - DESCRIPTORS
DESCRIPTORS: ACHING
DESCRIPTORS: ACHING

## 2024-12-24 ASSESSMENT — PAIN DESCRIPTION - LOCATION
LOCATION: ABDOMEN
LOCATION: ABDOMEN

## 2024-12-24 ASSESSMENT — PAIN - FUNCTIONAL ASSESSMENT: PAIN_FUNCTIONAL_ASSESSMENT: 0-10

## 2024-12-24 ASSESSMENT — PAIN DESCRIPTION - ORIENTATION
ORIENTATION: MID
ORIENTATION: MID;RIGHT

## 2024-12-24 ASSESSMENT — LIFESTYLE VARIABLES
HOW MANY STANDARD DRINKS CONTAINING ALCOHOL DO YOU HAVE ON A TYPICAL DAY: PATIENT DOES NOT DRINK
HOW OFTEN DO YOU HAVE A DRINK CONTAINING ALCOHOL: NEVER

## 2024-12-24 ASSESSMENT — PAIN DESCRIPTION - PAIN TYPE: TYPE: ACUTE PAIN

## 2024-12-24 ASSESSMENT — PAIN SCALES - GENERAL
PAINLEVEL_OUTOF10: 10
PAINLEVEL_OUTOF10: 10

## 2024-12-24 ASSESSMENT — PAIN DESCRIPTION - FREQUENCY: FREQUENCY: CONTINUOUS

## 2024-12-25 VITALS
BODY MASS INDEX: 20.02 KG/M2 | DIASTOLIC BLOOD PRESSURE: 82 MMHG | SYSTOLIC BLOOD PRESSURE: 142 MMHG | RESPIRATION RATE: 14 BRPM | HEIGHT: 75 IN | HEART RATE: 76 BPM | OXYGEN SATURATION: 98 % | WEIGHT: 161 LBS | TEMPERATURE: 99.4 F

## 2024-12-25 LAB
BILIRUB UR QL STRIP.AUTO: NEGATIVE
CLARITY UR: CLEAR
COLOR UR: YELLOW
GLUCOSE UR STRIP.AUTO-MCNC: NEGATIVE MG/DL
HGB UR QL STRIP.AUTO: ABNORMAL
KETONES UR STRIP.AUTO-MCNC: >=80 MG/DL
LEUKOCYTE ESTERASE UR QL STRIP.AUTO: NEGATIVE
NITRITE UR QL STRIP.AUTO: NEGATIVE
PH UR STRIP.AUTO: 8 [PH] (ref 5–8)
PROT UR STRIP.AUTO-MCNC: NEGATIVE MG/DL
RBC #/AREA URNS HPF: NORMAL /HPF (ref 0–4)
SP GR UR STRIP.AUTO: 1.01 (ref 1–1.03)
UA COMPLETE W REFLEX CULTURE PNL UR: ABNORMAL
UA DIPSTICK W REFLEX MICRO PNL UR: YES
URN SPEC COLLECT METH UR: ABNORMAL
UROBILINOGEN UR STRIP-ACNC: 0.2 E.U./DL
WBC #/AREA URNS HPF: NORMAL /HPF (ref 0–5)

## 2024-12-25 PROCEDURE — 6370000000 HC RX 637 (ALT 250 FOR IP)

## 2024-12-25 PROCEDURE — 81001 URINALYSIS AUTO W/SCOPE: CPT

## 2024-12-25 PROCEDURE — 96361 HYDRATE IV INFUSION ADD-ON: CPT

## 2024-12-25 RX ORDER — ONDANSETRON 4 MG/1
4 TABLET, ORALLY DISINTEGRATING ORAL 3 TIMES DAILY PRN
Qty: 21 TABLET | Refills: 0 | Status: SHIPPED | OUTPATIENT
Start: 2024-12-25

## 2024-12-25 RX ORDER — PROMETHAZINE HYDROCHLORIDE 25 MG/1
25 SUPPOSITORY RECTAL EVERY 6 HOURS PRN
Qty: 7 SUPPOSITORY | Refills: 0 | Status: SHIPPED | OUTPATIENT
Start: 2024-12-25 | End: 2025-01-01

## 2024-12-25 RX ORDER — OXYCODONE HYDROCHLORIDE 5 MG/1
5 TABLET ORAL ONCE
Status: COMPLETED | OUTPATIENT
Start: 2024-12-25 | End: 2024-12-25

## 2024-12-25 RX ADMIN — OXYCODONE 5 MG: 5 TABLET ORAL at 00:24

## 2024-12-25 RX ADMIN — AMOXICILLIN AND CLAVULANATE POTASSIUM 1 TABLET: 875; 125 TABLET, FILM COATED ORAL at 00:14

## 2024-12-25 ASSESSMENT — ENCOUNTER SYMPTOMS
CHEST TIGHTNESS: 0
VOMITING: 1
DIARRHEA: 1
ABDOMINAL PAIN: 1
SHORTNESS OF BREATH: 0
NAUSEA: 1
CONSTIPATION: 0

## 2024-12-25 ASSESSMENT — PAIN SCALES - GENERAL: PAINLEVEL_OUTOF10: 8

## 2024-12-25 ASSESSMENT — PAIN DESCRIPTION - LOCATION: LOCATION: ABDOMEN

## 2024-12-25 ASSESSMENT — PAIN DESCRIPTION - DESCRIPTORS: DESCRIPTORS: ACHING;BURNING

## 2024-12-25 ASSESSMENT — PAIN DESCRIPTION - ORIENTATION: ORIENTATION: LOWER

## 2024-12-25 NOTE — ED PROVIDER NOTES
[facility] ED  EMERGENCY DEPARTMENT ENCOUNTER        Pt Name: Jose Gallo  MRN: [id number]  Birthdate 1960  Date of evaluation: 12/24/2024  Provider: ASHLI Crawford - CNP  PCP: Zoila Khan MD  Note Started: 12:24 AM EST 12/25/24      EZEKIEL. I have evaluated this patient.        CHIEF COMPLAINT       Chief Complaint   Patient presents with    Vomiting     Patient was brought by the squad complaining of vomiting for the last 24 hours. Patient had diarrhea yesterday, watery brownish in color. Patient received 4mg of zofran on the way to the hospital.        HISTORY OF PRESENT ILLNESS: 1 or more Elements     History From: Patient, family member, EMR review    Chief Complaint: Abdominal pain, nausea, vomiting x 24 hours.  Had an episode of diarrhea yesterday    Jose Gallo is a 64 y.o. male with a past medical history notable for AML, hyperlipidemia, pain management, hernia repair who presents to the emergency department for evaluation of midepigastric abdominal pain with nausea, vomiting, diarrhea ongoing for approximately 24 hours.  Describes a nonbloody and nonbilious emesis.  No melena or hematochezia.  No recent travel or known direct sick contacts.  Is followed by [facility].  Most recent colonoscopy in July 2024.  He is on a 6-month schedule.  Does take Carafate, pantoprazole, Pepcid.  No recent dietary changes.  No chest pain or shortness of breath.  No subjective or objective fevers reported    Nursing Notes were all reviewed and agreed with or any disagreements were addressed in the HPI.    REVIEW OF SYSTEMS :      Review of Systems   Constitutional:  Negative for chills, fatigue and fever.   Respiratory:  Negative for chest tightness and shortness of breath.    Cardiovascular:  Negative for chest pain and palpitations.   Gastrointestinal:  Positive for abdominal pain (Midepigastric), diarrhea, nausea and vomiting. Negative for constipation.   Musculoskeletal:

## 2024-12-25 NOTE — ED NOTES
Instructed patient not to eat or drink anything, instructed relative not to give anything to eat or drink to the patient.

## 2024-12-25 NOTE — DISCHARGE INSTRUCTIONS
Your blood work is all stable.  No blood loss anemia.  CT does show thickening of the mucosal membrane of the sigmoid colon.  This could relate to inflammatory or infectious processes.  You were given first dose of Augmentin in the emergency department.    I sent a prescription for nausea medication called Zofran.  This is a disintegrating tablet that dissolves under your tongue.  If you have persistent nausea and vomiting after trialing this medication I also sent a promethazine suppository.    I also sent Augmentin to your pharmacy.   and take as directed.    Continue taking your Protonix, Pepcid, Carafate.  These medications will protect the lining of the stomach.  Follow-up with your gastroenterologist to consider outpatient endoscopy/colonoscopy to expand her diagnostic workup.  Additionally follow-up with your primary care as needed.  Please return if worse.

## 2024-12-26 ENCOUNTER — APPOINTMENT (OUTPATIENT)
Dept: GENERAL RADIOLOGY | Age: 64
End: 2024-12-26
Payer: MEDICARE

## 2024-12-26 ENCOUNTER — HOSPITAL ENCOUNTER (EMERGENCY)
Age: 64
Discharge: HOME | End: 2024-12-26
Attending: STUDENT IN AN ORGANIZED HEALTH CARE EDUCATION/TRAINING PROGRAM
Payer: MEDICARE

## 2024-12-26 ENCOUNTER — APPOINTMENT (OUTPATIENT)
Dept: CT IMAGING | Age: 64
End: 2024-12-26
Payer: MEDICARE

## 2024-12-26 VITALS
HEART RATE: 62 BPM | TEMPERATURE: 98.4 F | HEIGHT: 75 IN | DIASTOLIC BLOOD PRESSURE: 83 MMHG | BODY MASS INDEX: 20.27 KG/M2 | SYSTOLIC BLOOD PRESSURE: 130 MMHG | OXYGEN SATURATION: 96 % | WEIGHT: 163 LBS | RESPIRATION RATE: 11 BRPM

## 2024-12-26 DIAGNOSIS — R03.0 ELEVATED BLOOD PRESSURE READING: ICD-10-CM

## 2024-12-26 DIAGNOSIS — R10.13 ABDOMINAL PAIN, EPIGASTRIC: ICD-10-CM

## 2024-12-26 DIAGNOSIS — R11.2 NAUSEA AND VOMITING, UNSPECIFIED VOMITING TYPE: Primary | ICD-10-CM

## 2024-12-26 DIAGNOSIS — E87.6 HYPOKALEMIA: ICD-10-CM

## 2024-12-26 LAB
ALBUMIN SERPL-MCNC: 4.2 G/DL (ref 3.4–5)
ALP SERPL-CCNC: 91 U/L (ref 40–129)
ALT SERPL-CCNC: 20 U/L (ref 10–40)
ANION GAP SERPL CALCULATED.3IONS-SCNC: 15 MMOL/L (ref 3–16)
AST SERPL-CCNC: 33 U/L (ref 15–37)
BACTERIA URNS QL MICRO: ABNORMAL /HPF
BASOPHILS # BLD: 0 K/UL (ref 0–0.2)
BASOPHILS NFR BLD: 0.3 %
BILIRUB DIRECT SERPL-MCNC: 0.3 MG/DL (ref 0–0.3)
BILIRUB INDIRECT SERPL-MCNC: 0.4 MG/DL (ref 0–1)
BILIRUB SERPL-MCNC: 0.7 MG/DL (ref 0–1)
BILIRUB UR QL STRIP.AUTO: ABNORMAL
BUN SERPL-MCNC: 28 MG/DL (ref 7–20)
CALCIUM SERPL-MCNC: 8.5 MG/DL (ref 8.3–10.6)
CHLORIDE SERPL-SCNC: 98 MMOL/L (ref 99–110)
CLARITY UR: CLEAR
CO2 SERPL-SCNC: 23 MMOL/L (ref 21–32)
COLOR UR: YELLOW
CREAT SERPL-MCNC: 0.8 MG/DL (ref 0.8–1.3)
DEPRECATED RDW RBC AUTO: 13.3 % (ref 12.4–15.4)
EOSINOPHIL # BLD: 0 K/UL (ref 0–0.6)
EOSINOPHIL NFR BLD: 0.2 %
GFR SERPLBLD CREATININE-BSD FMLA CKD-EPI: >90 ML/MIN/{1.73_M2}
GLUCOSE SERPL-MCNC: 104 MG/DL (ref 70–99)
GLUCOSE UR STRIP.AUTO-MCNC: NEGATIVE MG/DL
HCT VFR BLD AUTO: 43.2 % (ref 40.5–52.5)
HGB BLD-MCNC: 15.4 G/DL (ref 13.5–17.5)
HGB UR QL STRIP.AUTO: ABNORMAL
KETONES UR STRIP.AUTO-MCNC: >=80 MG/DL
LEUKOCYTE ESTERASE UR QL STRIP.AUTO: NEGATIVE
LIPASE SERPL-CCNC: 31 U/L (ref 13–60)
LYMPHOCYTES # BLD: 2.3 K/UL (ref 1–5.1)
LYMPHOCYTES NFR BLD: 18 %
MAGNESIUM SERPL-MCNC: 1.95 MG/DL (ref 1.8–2.4)
MCH RBC QN AUTO: 34.1 PG (ref 26–34)
MCHC RBC AUTO-ENTMCNC: 35.7 G/DL (ref 31–36)
MCV RBC AUTO: 95.6 FL (ref 80–100)
MONOCYTES # BLD: 0.6 K/UL (ref 0–1.3)
MONOCYTES NFR BLD: 4.8 %
MUCOUS THREADS #/AREA URNS LPF: ABNORMAL /LPF
NEUTROPHILS # BLD: 9.8 K/UL (ref 1.7–7.7)
NEUTROPHILS NFR BLD: 76.7 %
NITRITE UR QL STRIP.AUTO: NEGATIVE
PH UR STRIP.AUTO: 7 [PH] (ref 5–8)
PLATELET # BLD AUTO: 221 K/UL (ref 135–450)
PMV BLD AUTO: 6.2 FL (ref 5–10.5)
POTASSIUM SERPL-SCNC: 3.3 MMOL/L (ref 3.5–5.1)
PROT SERPL-MCNC: 6.6 G/DL (ref 6.4–8.2)
PROT UR STRIP.AUTO-MCNC: ABNORMAL MG/DL
RBC # BLD AUTO: 4.52 M/UL (ref 4.2–5.9)
RBC #/AREA URNS HPF: ABNORMAL /HPF (ref 0–4)
SODIUM SERPL-SCNC: 136 MMOL/L (ref 136–145)
SP GR UR STRIP.AUTO: 1.02 (ref 1–1.03)
TROPONIN, HIGH SENSITIVITY: 8 NG/L (ref 0–22)
TROPONIN, HIGH SENSITIVITY: 9 NG/L (ref 0–22)
UA COMPLETE W REFLEX CULTURE PNL UR: ABNORMAL
UA DIPSTICK W REFLEX MICRO PNL UR: YES
URN SPEC COLLECT METH UR: ABNORMAL
UROBILINOGEN UR STRIP-ACNC: 0.2 E.U./DL
WBC # BLD AUTO: 12.8 K/UL (ref 4–11)
WBC #/AREA URNS HPF: ABNORMAL /HPF (ref 0–5)

## 2024-12-26 PROCEDURE — 74174 CTA ABD&PLVS W/CONTRAST: CPT

## 2024-12-26 PROCEDURE — 6360000004 HC RX CONTRAST MEDICATION

## 2024-12-26 PROCEDURE — 99285 EMERGENCY DEPT VISIT HI MDM: CPT

## 2024-12-26 PROCEDURE — 81001 URINALYSIS AUTO W/SCOPE: CPT

## 2024-12-26 PROCEDURE — 85025 COMPLETE CBC W/AUTO DIFF WBC: CPT

## 2024-12-26 PROCEDURE — 2580000003 HC RX 258

## 2024-12-26 PROCEDURE — 96374 THER/PROPH/DIAG INJ IV PUSH: CPT

## 2024-12-26 PROCEDURE — 83735 ASSAY OF MAGNESIUM: CPT

## 2024-12-26 PROCEDURE — 6360000002 HC RX W HCPCS

## 2024-12-26 PROCEDURE — 6370000000 HC RX 637 (ALT 250 FOR IP)

## 2024-12-26 PROCEDURE — 96375 TX/PRO/DX INJ NEW DRUG ADDON: CPT

## 2024-12-26 PROCEDURE — 80048 BASIC METABOLIC PNL TOTAL CA: CPT

## 2024-12-26 PROCEDURE — 83690 ASSAY OF LIPASE: CPT

## 2024-12-26 PROCEDURE — 84484 ASSAY OF TROPONIN QUANT: CPT

## 2024-12-26 PROCEDURE — 80076 HEPATIC FUNCTION PANEL: CPT

## 2024-12-26 PROCEDURE — 71045 X-RAY EXAM CHEST 1 VIEW: CPT

## 2024-12-26 PROCEDURE — 6370000000 HC RX 637 (ALT 250 FOR IP): Performed by: STUDENT IN AN ORGANIZED HEALTH CARE EDUCATION/TRAINING PROGRAM

## 2024-12-26 PROCEDURE — 93005 ELECTROCARDIOGRAM TRACING: CPT

## 2024-12-26 RX ORDER — IOPAMIDOL 755 MG/ML
75 INJECTION, SOLUTION INTRAVASCULAR
Status: COMPLETED | OUTPATIENT
Start: 2024-12-26 | End: 2024-12-26

## 2024-12-26 RX ORDER — OLANZAPINE 5 MG/1
5 TABLET, ORALLY DISINTEGRATING ORAL ONCE
Status: COMPLETED | OUTPATIENT
Start: 2024-12-26 | End: 2024-12-26

## 2024-12-26 RX ORDER — POTASSIUM CHLORIDE 7.45 MG/ML
10 INJECTION INTRAVENOUS ONCE
Status: COMPLETED | OUTPATIENT
Start: 2024-12-26 | End: 2024-12-26

## 2024-12-26 RX ORDER — PANTOPRAZOLE SODIUM 40 MG/10ML
40 INJECTION, POWDER, LYOPHILIZED, FOR SOLUTION INTRAVENOUS ONCE
Status: COMPLETED | OUTPATIENT
Start: 2024-12-26 | End: 2024-12-26

## 2024-12-26 RX ORDER — 0.9 % SODIUM CHLORIDE 0.9 %
1000 INTRAVENOUS SOLUTION INTRAVENOUS ONCE
Status: COMPLETED | OUTPATIENT
Start: 2024-12-26 | End: 2024-12-26

## 2024-12-26 RX ORDER — ONDANSETRON 2 MG/ML
4 INJECTION INTRAMUSCULAR; INTRAVENOUS EVERY 6 HOURS PRN
Status: DISCONTINUED | OUTPATIENT
Start: 2024-12-26 | End: 2024-12-26

## 2024-12-26 RX ORDER — OLANZAPINE 5 MG/1
5 TABLET ORAL NIGHTLY
Qty: 5 TABLET | Refills: 0 | Status: SHIPPED | OUTPATIENT
Start: 2024-12-26 | End: 2024-12-31

## 2024-12-26 RX ADMIN — HYDROMORPHONE HYDROCHLORIDE 1 MG: 1 INJECTION, SOLUTION INTRAMUSCULAR; INTRAVENOUS; SUBCUTANEOUS at 19:39

## 2024-12-26 RX ADMIN — POTASSIUM CHLORIDE 10 MEQ: 7.45 INJECTION INTRAVENOUS at 21:31

## 2024-12-26 RX ADMIN — LIDOCAINE HYDROCHLORIDE: 20 SOLUTION ORAL at 21:26

## 2024-12-26 RX ADMIN — PANTOPRAZOLE SODIUM 40 MG: 40 INJECTION, POWDER, FOR SOLUTION INTRAVENOUS at 19:01

## 2024-12-26 RX ADMIN — IOPAMIDOL 75 ML: 755 INJECTION, SOLUTION INTRAVENOUS at 20:31

## 2024-12-26 RX ADMIN — ONDANSETRON 4 MG: 2 INJECTION INTRAMUSCULAR; INTRAVENOUS at 19:01

## 2024-12-26 RX ADMIN — SODIUM CHLORIDE 1000 ML: 9 INJECTION, SOLUTION INTRAVENOUS at 19:38

## 2024-12-26 RX ADMIN — OLANZAPINE 5 MG: 5 TABLET, ORALLY DISINTEGRATING ORAL at 22:02

## 2024-12-26 RX ADMIN — POTASSIUM CHLORIDE 10 MEQ: 7.45 INJECTION INTRAVENOUS at 20:26

## 2024-12-26 ASSESSMENT — LIFESTYLE VARIABLES
HOW OFTEN DO YOU HAVE A DRINK CONTAINING ALCOHOL: NEVER
HOW MANY STANDARD DRINKS CONTAINING ALCOHOL DO YOU HAVE ON A TYPICAL DAY: PATIENT DOES NOT DRINK

## 2024-12-26 ASSESSMENT — PAIN - FUNCTIONAL ASSESSMENT: PAIN_FUNCTIONAL_ASSESSMENT: 0-10

## 2024-12-26 ASSESSMENT — PAIN DESCRIPTION - PAIN TYPE: TYPE: ACUTE PAIN

## 2024-12-26 ASSESSMENT — PAIN DESCRIPTION - LOCATION
LOCATION: ABDOMEN
LOCATION: ABDOMEN

## 2024-12-26 ASSESSMENT — PAIN SCALES - GENERAL
PAINLEVEL_OUTOF10: 7
PAINLEVEL_OUTOF10: 10

## 2024-12-26 ASSESSMENT — PAIN DESCRIPTION - DESCRIPTORS: DESCRIPTORS: ACHING

## 2024-12-26 ASSESSMENT — PAIN DESCRIPTION - ORIENTATION: ORIENTATION: RIGHT;LEFT;UPPER

## 2024-12-26 NOTE — ED PROVIDER NOTES
[facility] ED  EMERGENCY DEPARTMENT ENCOUNTER        Pt Name: Jose Gallo  MRN: [id number]  Birthdate 1960  Date of evaluation: 12/26/2024  Provider: MADELAINE Gamez  PCP: Zoila Khan MD  Note Started: 6:39 PM EST 12/26/24       I have seen and evaluated this patient with my supervising physician Damaso Pride MD.      CHIEF COMPLAINT       Chief Complaint   Patient presents with    Nausea     N and V, abd pain left and right upper.         HISTORY OF PRESENT ILLNESS: 1 or more Elements     History From: Patient    Limitations to history : None    Social Determinants Significantly Affecting Health : None    Chief Complaint: Nausea, vomiting, chest pain    Jose Gallo is a 64 y.o. male who presents to the emergency department for nausea and vomiting.  States that he was seen here a few days ago for the same complaint.  States that he has been having abdominal pain in the mid epigastric region that is intermittent.  Denies meadow Rubina, hematic emesis, or melena.  He denies diarrhea or constipation.  He denies any recent travel, sick contacts, fevers, chills.  States that he attempted to take the medications he sent home with including Zofran but he was unable to keep it down.  States that on arrival to the emergency room he started to develop a burning chest pain.  Denies shortness of breath.    Nursing Notes were all reviewed and agreed with or any disagreements were addressed in the HPI.    REVIEW OF SYSTEMS :      Review of Systems    Positives and Pertinent negatives as per HPI.     SURGICAL HISTORY     Past Surgical History:   Procedure Laterality Date    CHOLECYSTECTOMY      COLONOSCOPY N/A 11/16/2023    COLONOSCOPY performed by Matias Baldwin MD at [facility] ENDOSCOPY    GROIN SURGERY      masses removed    HERNIA REPAIR Bilateral     inguinal    UPPER GASTROINTESTINAL ENDOSCOPY N/A 11/16/2023    ESOPHAGOGASTRODUODENOSCOPY performed by Matias Baldwin MD at  (Peripheral Line) (0 mEq IntraVENous Stopped 12/26/24 2130)   iopamidol (ISOVUE-370) 76 % injection 75 mL (75 mLs IntraVENous Given 12/26/24 2031)   aluminum & magnesium hydroxide-simethicone (MAALOX) 30 mL, lidocaine viscous hcl (XYLOCAINE) 5 mL (GI COCKTAIL) ( Oral Given 12/26/24 2126)   OLANZapine zydis (ZYPREXA) disintegrating tablet 5 mg (5 mg Oral Given 12/26/24 2202)             Is this patient to be included in the SEP-1 Core Measure due to severe sepsis or septic shock?   No   Exclusion criteria - the patient is NOT to be included for SEP-1 Core Measure due to:  2+ SIRS criteria are not met      Chronic Conditions affecting care:    has a past medical history of AML (acute myeloblastic leukemia) (HCC) (2002), Hyperlipidemia, and Pain management.    CONSULTS: (Who and What was discussed)  None      Records Reviewed (External and Source)   CT abdomen pelvis 12/20/2024 showed diffuse thickening of the sigmoid colon    CC/HPI Summary, DDx, ED Course, and Reassessment:   This is a 64-year-old male who presents to the emergency department with persistent nausea and vomiting. On arrival, he also reported burning chest pain. He was evaluated for similar complaints on 12/24, during which a CT abdomen/pelvis revealed diffuse thickening of the sigmoid colon, with a recommendation for a colonoscopy. He has been unable to keep medications down due to vomiting. The patient appears notably anxious and hypertensive but is afebrile. Physical exam is notable for midepigastric tenderness. Given the patient’s midepigastric pain and burning chest pain, there was concern for a possible gastrointestinal bleed.    ED course  Labs: CBC with leukocytosis with blood cell count of 12.8, no acute anemia.  BMP with potassium of 3.3 and BUN of 28.  Lipase within normal limits.  Magnesium within normal limits.  Troponin within normal limits x 2.  Urinalysis did show ketones consistent with dehydration.  EKG shows normal sinus

## 2024-12-27 LAB
EKG ATRIAL RATE: 63 BPM
EKG DIAGNOSIS: NORMAL
EKG P AXIS: 80 DEGREES
EKG P-R INTERVAL: 150 MS
EKG Q-T INTERVAL: 430 MS
EKG QRS DURATION: 106 MS
EKG QTC CALCULATION (BAZETT): 440 MS
EKG R AXIS: -29 DEGREES
EKG T AXIS: 35 DEGREES
EKG VENTRICULAR RATE: 63 BPM

## 2024-12-27 PROCEDURE — 93010 ELECTROCARDIOGRAM REPORT: CPT | Performed by: INTERNAL MEDICINE

## 2024-12-27 NOTE — DISCHARGE INSTRUCTIONS
Please call your GI doctor tomorrow to schedule a follow-up appointment.  Your blood pressure was elevated when in the department, please call and schedule a follow-up appointment with your primary care provider.  Return to this department if you develop any new or worsening symptoms including chest pain, shortness of breath, blood in your vomit or stool.

## 2024-12-27 NOTE — ED PROVIDER NOTES
I independently performed a history and physical on Jose Gallo.     I have discussed the case with the EZEKIEL/resident at 2000 and approve / take responsibility for the initial management plan and anticipated disposition as documented below.     In summary the patient presents with acute on chronic epigastric pain as well as chest pain on background of history of gastritis.  On my assessment the patient is afebrile hemodynamically stable and in no acute distress.  His presenting hypertension is resolved.  His initial chest pain is resolved as well.  On exam his breath sounds are clear his abdomen is soft nontender nondistended extremities are warm and well-perfused.  His abdominal exam without peritoneal signs.  Patient's workup is notable for prominent ketonuria with suggesting a degree of dehydration secondary to his gastritis.  Also with mild hypokalemia.  He has a mild leukocytosis stable from recent prior.  No focality for infection.  CT imaging of the chest abdomen pelvis including angiography is benign.  Overall his course was reassuring symptomatically resolved no high risk features and therefore discharged in stable condition.    I interpreted the following studies:  ECG: Sinus rhythm at a rate of 63 without ectopy.  Normal axis and intervals.  No evidence of acute ischemia.  No prior for comparison.    I personally discussed the patient's management with the following:  nikkie         For further details of Jose Gallo's emergency department encounter, please see the EZEKIEL/resident's documentation. Please note the signature time recorded here indicates the limit of my supervision of this case and should the patient require further management prior to disposition I have signed the case out to my colleague Damaso Hernández MD  12/27/24 0140

## (undated) DEVICE — ESOPHAGEAL/PYLORIC/BILIARY WIREGUIDED BALLOON DILATATION CATHETER: Brand: CRE™ PRO

## (undated) DEVICE — SYRINGE INFL 60ML DISP ALLIANCE II

## (undated) DEVICE — CANNULA SAMP CO2 AD GRN 7FT CO2 AND 7FT O2 TBNG UNIV CONN

## (undated) DEVICE — FORCEPS BX L240CM WRK CHN 2.8MM STD CAP W/ NDL MIC MESH

## (undated) DEVICE — FORCEPS BX L240CM JAW DIA2.4MM ORNG L CAP W/ NDL DISP RAD

## (undated) DEVICE — ESOPHAGEAL/PYLORIC/COLONIC/BILIARY WIREGUIDED BALLOON DILATATION CATHETER: Brand: CRE™ PRO

## (undated) DEVICE — FORCEPS BX L240CM JAW DIA2.8MM L CAP W/ NDL MIC MESH TOOTH